# Patient Record
Sex: MALE | Race: WHITE | NOT HISPANIC OR LATINO | Employment: FULL TIME | URBAN - METROPOLITAN AREA
[De-identification: names, ages, dates, MRNs, and addresses within clinical notes are randomized per-mention and may not be internally consistent; named-entity substitution may affect disease eponyms.]

---

## 2017-07-27 ENCOUNTER — OFFICE VISIT - HEALTHEAST (OUTPATIENT)
Dept: INTERNAL MEDICINE | Facility: CLINIC | Age: 27
End: 2017-07-27

## 2017-07-27 ENCOUNTER — COMMUNICATION - HEALTHEAST (OUTPATIENT)
Dept: INTERNAL MEDICINE | Facility: CLINIC | Age: 27
End: 2017-07-27

## 2017-07-27 ENCOUNTER — COMMUNICATION - HEALTHEAST (OUTPATIENT)
Dept: TELEHEALTH | Facility: CLINIC | Age: 27
End: 2017-07-27

## 2017-07-27 DIAGNOSIS — Z00.00 ROUTINE GENERAL MEDICAL EXAMINATION AT A HEALTH CARE FACILITY: ICD-10-CM

## 2017-07-27 LAB
CHOLEST SERPL-MCNC: 130 MG/DL
FASTING STATUS PATIENT QL REPORTED: YES
HDLC SERPL-MCNC: 46 MG/DL
LDLC SERPL CALC-MCNC: 75 MG/DL
TRIGL SERPL-MCNC: 43 MG/DL

## 2017-07-27 ASSESSMENT — MIFFLIN-ST. JEOR: SCORE: 1928.54

## 2018-08-03 ENCOUNTER — AMBULATORY - HEALTHEAST (OUTPATIENT)
Dept: INTERNAL MEDICINE | Facility: CLINIC | Age: 28
End: 2018-08-03

## 2018-08-06 ENCOUNTER — OFFICE VISIT - HEALTHEAST (OUTPATIENT)
Dept: INTERNAL MEDICINE | Facility: CLINIC | Age: 28
End: 2018-08-06

## 2018-08-06 DIAGNOSIS — M67.431 GANGLION CYST OF WRIST, RIGHT: ICD-10-CM

## 2018-08-06 DIAGNOSIS — Z00.00 ROUTINE GENERAL MEDICAL EXAMINATION AT A HEALTH CARE FACILITY: ICD-10-CM

## 2018-08-06 DIAGNOSIS — T70.0XXA EAR BAROTRAUMA, INITIAL ENCOUNTER: ICD-10-CM

## 2018-08-06 LAB
ALBUMIN SERPL-MCNC: 4.6 G/DL (ref 3.5–5)
ALP SERPL-CCNC: 62 U/L (ref 45–120)
ALT SERPL W P-5'-P-CCNC: 28 U/L (ref 0–45)
ANION GAP SERPL CALCULATED.3IONS-SCNC: 7 MMOL/L (ref 5–18)
AST SERPL W P-5'-P-CCNC: 22 U/L (ref 0–40)
BILIRUB SERPL-MCNC: 0.8 MG/DL (ref 0–1)
BUN SERPL-MCNC: 17 MG/DL (ref 8–22)
CALCIUM SERPL-MCNC: 10.2 MG/DL (ref 8.5–10.5)
CHLORIDE BLD-SCNC: 106 MMOL/L (ref 98–107)
CHOLEST SERPL-MCNC: 178 MG/DL
CO2 SERPL-SCNC: 26 MMOL/L (ref 22–31)
CREAT SERPL-MCNC: 0.95 MG/DL (ref 0.7–1.3)
ERYTHROCYTE [DISTWIDTH] IN BLOOD BY AUTOMATED COUNT: 12 % (ref 11–14.5)
FASTING STATUS PATIENT QL REPORTED: NORMAL
GFR SERPL CREATININE-BSD FRML MDRD: >60 ML/MIN/1.73M2
GLUCOSE BLD-MCNC: 91 MG/DL (ref 70–125)
HCT VFR BLD AUTO: 47.5 % (ref 40–54)
HDLC SERPL-MCNC: 53 MG/DL
HGB BLD-MCNC: 15.8 G/DL (ref 14–18)
LDLC SERPL CALC-MCNC: 102 MG/DL
MCH RBC QN AUTO: 28.7 PG (ref 27–34)
MCHC RBC AUTO-ENTMCNC: 33.1 G/DL (ref 32–36)
MCV RBC AUTO: 87 FL (ref 80–100)
PLATELET # BLD AUTO: 199 THOU/UL (ref 140–440)
PMV BLD AUTO: 8.5 FL (ref 7–10)
POTASSIUM BLD-SCNC: 4.3 MMOL/L (ref 3.5–5)
PROT SERPL-MCNC: 7.3 G/DL (ref 6–8)
RBC # BLD AUTO: 5.49 MILL/UL (ref 4.4–6.2)
SODIUM SERPL-SCNC: 139 MMOL/L (ref 136–145)
TRIGL SERPL-MCNC: 115 MG/DL
WBC: 7.4 THOU/UL (ref 4–11)

## 2018-08-06 ASSESSMENT — MIFFLIN-ST. JEOR: SCORE: 1990.92

## 2018-08-07 ENCOUNTER — COMMUNICATION - HEALTHEAST (OUTPATIENT)
Dept: INTERNAL MEDICINE | Facility: CLINIC | Age: 28
End: 2018-08-07

## 2019-08-08 ENCOUNTER — OFFICE VISIT - HEALTHEAST (OUTPATIENT)
Dept: INTERNAL MEDICINE | Facility: CLINIC | Age: 29
End: 2019-08-08

## 2019-08-08 ENCOUNTER — COMMUNICATION - HEALTHEAST (OUTPATIENT)
Dept: INTERNAL MEDICINE | Facility: CLINIC | Age: 29
End: 2019-08-08

## 2019-08-08 DIAGNOSIS — Z23 NEED FOR DIPHTHERIA-TETANUS-PERTUSSIS (TDAP) VACCINE: ICD-10-CM

## 2019-08-08 DIAGNOSIS — Z00.00 ROUTINE GENERAL MEDICAL EXAMINATION AT A HEALTH CARE FACILITY: ICD-10-CM

## 2019-08-08 LAB
ALBUMIN SERPL-MCNC: 4.7 G/DL (ref 3.5–5)
ALP SERPL-CCNC: 72 U/L (ref 45–120)
ALT SERPL W P-5'-P-CCNC: 27 U/L (ref 0–45)
ANION GAP SERPL CALCULATED.3IONS-SCNC: 7 MMOL/L (ref 5–18)
AST SERPL W P-5'-P-CCNC: 25 U/L (ref 0–40)
BILIRUB SERPL-MCNC: 0.9 MG/DL (ref 0–1)
BUN SERPL-MCNC: 17 MG/DL (ref 8–22)
CALCIUM SERPL-MCNC: 10.2 MG/DL (ref 8.5–10.5)
CHLORIDE BLD-SCNC: 107 MMOL/L (ref 98–107)
CHOLEST SERPL-MCNC: 169 MG/DL
CO2 SERPL-SCNC: 26 MMOL/L (ref 22–31)
CREAT SERPL-MCNC: 1.13 MG/DL (ref 0.7–1.3)
ERYTHROCYTE [DISTWIDTH] IN BLOOD BY AUTOMATED COUNT: 12.1 % (ref 11–14.5)
FASTING STATUS PATIENT QL REPORTED: YES
GFR SERPL CREATININE-BSD FRML MDRD: >60 ML/MIN/1.73M2
GLUCOSE BLD-MCNC: 100 MG/DL (ref 70–125)
HCT VFR BLD AUTO: 47.7 % (ref 40–54)
HDLC SERPL-MCNC: 51 MG/DL
HGB BLD-MCNC: 16.1 G/DL (ref 14–18)
LDLC SERPL CALC-MCNC: 104 MG/DL
MCH RBC QN AUTO: 29.1 PG (ref 27–34)
MCHC RBC AUTO-ENTMCNC: 33.8 G/DL (ref 32–36)
MCV RBC AUTO: 86 FL (ref 80–100)
PLATELET # BLD AUTO: 208 THOU/UL (ref 140–440)
PMV BLD AUTO: 8.1 FL (ref 7–10)
POTASSIUM BLD-SCNC: 4.9 MMOL/L (ref 3.5–5)
PROT SERPL-MCNC: 7.2 G/DL (ref 6–8)
RBC # BLD AUTO: 5.54 MILL/UL (ref 4.4–6.2)
SODIUM SERPL-SCNC: 140 MMOL/L (ref 136–145)
TRIGL SERPL-MCNC: 72 MG/DL
WBC: 6 THOU/UL (ref 4–11)

## 2019-08-08 ASSESSMENT — MIFFLIN-ST. JEOR: SCORE: 1919.47

## 2020-09-02 ENCOUNTER — OFFICE VISIT - HEALTHEAST (OUTPATIENT)
Dept: FAMILY MEDICINE | Facility: CLINIC | Age: 30
End: 2020-09-02

## 2020-09-02 ENCOUNTER — COMMUNICATION - HEALTHEAST (OUTPATIENT)
Dept: SCHEDULING | Facility: CLINIC | Age: 30
End: 2020-09-02

## 2020-09-02 DIAGNOSIS — T63.444A BEE STING REACTION, UNDETERMINED INTENT, INITIAL ENCOUNTER: ICD-10-CM

## 2020-09-02 RX ORDER — VIT C/HESPERIDIN/BIOFLAVONOIDS 500-100 MG
TABLET ORAL DAILY
Status: SHIPPED | COMMUNITY
Start: 2020-09-02 | End: 2024-08-07

## 2020-11-05 ENCOUNTER — OFFICE VISIT - HEALTHEAST (OUTPATIENT)
Dept: INTERNAL MEDICINE | Facility: CLINIC | Age: 30
End: 2020-11-05

## 2020-11-05 DIAGNOSIS — Z23 INFLUENZA VACCINATION ADMINISTERED AT CURRENT VISIT: ICD-10-CM

## 2020-11-05 DIAGNOSIS — Z20.822 ENCOUNTER FOR LABORATORY TESTING FOR COVID-19 VIRUS: ICD-10-CM

## 2020-11-05 DIAGNOSIS — Z83.719 FAMILY HISTORY OF COLONIC POLYPS: ICD-10-CM

## 2020-11-05 DIAGNOSIS — Z14.1 CYSTIC FIBROSIS CARRIER: ICD-10-CM

## 2020-11-05 DIAGNOSIS — Z00.00 ROUTINE GENERAL MEDICAL EXAMINATION AT A HEALTH CARE FACILITY: ICD-10-CM

## 2020-11-05 ASSESSMENT — MIFFLIN-ST. JEOR: SCORE: 1910.17

## 2020-11-08 ENCOUNTER — COMMUNICATION - HEALTHEAST (OUTPATIENT)
Dept: SCHEDULING | Facility: CLINIC | Age: 30
End: 2020-11-08

## 2021-04-03 ENCOUNTER — COMMUNICATION - HEALTHEAST (OUTPATIENT)
Dept: SCHEDULING | Facility: CLINIC | Age: 31
End: 2021-04-03

## 2021-04-06 ENCOUNTER — COMMUNICATION - HEALTHEAST (OUTPATIENT)
Dept: CARE COORDINATION | Facility: CLINIC | Age: 31
End: 2021-04-06

## 2021-04-07 ENCOUNTER — COMMUNICATION - HEALTHEAST (OUTPATIENT)
Dept: SCHEDULING | Facility: CLINIC | Age: 31
End: 2021-04-07

## 2021-05-31 VITALS — HEIGHT: 70 IN | BODY MASS INDEX: 30.78 KG/M2 | WEIGHT: 215 LBS

## 2021-05-31 NOTE — PROGRESS NOTES
"Office Visit - Follow up    Sheldon Downing   28 y.o. 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  Medical history entirely negative.  DPT is updated.    General health has been excellent without major concerns    Previous history reviewed    History of hypersensitivity to Ceclor with hives no other known allergies    He is an avid hiker and skier.    Non-smoker ethanol intake minimal.  No other new concerns or complaints.    ROS: A comprehensive review of systems was performed and was otherwise negative except as mentioned above.     Exam  Alert and oriented head and neck negative EENT normal skin and lymphatics negative lungs clear heart normal abdomen benign extremities normal neuro exam negative   /70   Pulse 63   Ht 5' 9.5\" (1.765 m)   Wt 213 lb (96.6 kg)   SpO2 99%   BMI 31.00 kg/m      Assessment and Plan  Normal physical exam labs pending    Sheldon was seen today for annual exam.    Diagnoses and all orders for this visit:    Need for diphtheria-tetanus-pertussis (Tdap) vaccine  -     Tdap vaccine,  8yo or older,  IM  -     HM2(CBC w/o Differential); Future  -     Comprehensive Metabolic Panel; Future  -     Lipid Cascade; Future  -     HM2(CBC w/o Differential)  -     Comprehensive Metabolic Panel  -     Lipid Cascade    Routine general medical examination at a health care facility          Time: total time spent with the patient was 25 minutes of which >50% was spent in counseling and coordination of care        Allergies   Allergen Reactions     Ceclor [Cefaclor] Hives       Medications :  Prior to Admission medications    Medication Sig Start Date End Date Taking? Authorizing Provider   MULTIVITAMIN ORAL Take by mouth daily.          Yes PROVIDER, HISTORICAL        Past Medical History: No past medical history on " file.    Past Surgical History: No past surgical history on file.    Social History:   Social History     Socioeconomic History     Marital status:      Spouse name: Not on file     Number of children: Not on file     Years of education: Not on file     Highest education level: Not on file   Occupational History     Not on file   Social Needs     Financial resource strain: Not on file     Food insecurity:     Worry: Not on file     Inability: Not on file     Transportation needs:     Medical: Not on file     Non-medical: Not on file   Tobacco Use     Smoking status: Never Smoker     Smokeless tobacco: Former User     Types: Chew   Substance and Sexual Activity     Alcohol use: Not on file     Drug use: Not on file     Sexual activity: Not on file   Lifestyle     Physical activity:     Days per week: Not on file     Minutes per session: Not on file     Stress: Not on file   Relationships     Social connections:     Talks on phone: Not on file     Gets together: Not on file     Attends [employer] service: Not on file     Active member of club or organization: Not on file     Attends meetings of clubs or organizations: Not on file     Relationship status: Not on file     Intimate partner violence:     Fear of current or ex partner: Not on file     Emotionally abused: Not on file     Physically abused: Not on file     Forced sexual activity: Not on file   Other Topics Concern     Not on file   Social History Narrative     Not on file       Family History: No family history on file.      Miles Silva MD

## 2021-06-01 VITALS — WEIGHT: 227 LBS | HEIGHT: 70 IN | BODY MASS INDEX: 32.5 KG/M2

## 2021-06-03 VITALS — WEIGHT: 213 LBS | HEIGHT: 70 IN | BODY MASS INDEX: 30.49 KG/M2

## 2021-06-04 VITALS
DIASTOLIC BLOOD PRESSURE: 80 MMHG | TEMPERATURE: 97.8 F | BODY MASS INDEX: 31.29 KG/M2 | WEIGHT: 215 LBS | SYSTOLIC BLOOD PRESSURE: 137 MMHG | OXYGEN SATURATION: 98 % | HEART RATE: 52 BPM | RESPIRATION RATE: 16 BRPM

## 2021-06-05 VITALS
HEIGHT: 70 IN | DIASTOLIC BLOOD PRESSURE: 72 MMHG | BODY MASS INDEX: 29.95 KG/M2 | SYSTOLIC BLOOD PRESSURE: 116 MMHG | HEART RATE: 80 BPM | WEIGHT: 209.2 LBS

## 2021-06-11 NOTE — TELEPHONE ENCOUNTER
RN Triage:    Was stung by bee at back of throat 1 hour ago.  Throat is sore and feels swollen.  Does not feel as though his breathing is compromised.  Precautions discussed.  He plans to go to [facility] in clinic now per guideline.    Brenda Welch RN  [facility] Nurse Advisor    Reason for Disposition    Sting inside the mouth    Additional Information    Negative: Passed out (i.e., fainted, collapsed and was not responding)    Negative: Wheezing or difficulty breathing    Negative: Hoarseness, cough, or tightness in the throat or chest    Negative: Swollen tongue or difficulty swallowing    Negative: Life-threatening reaction in past to sting (anaphylaxis) and < 2 hours since sting    Negative: Sounds like a life-threatening emergency to the triager    Negative: Not a bee, wasp, hornet, or yellow jacket sting    Negative: Widespread hives, itching, or facial swelling and started within 2 hours of sting    Negative: Vomiting or abdominal cramps and started within 2 hours of sting    Negative: Gave epinephrine shot and no symptoms now    Negative: Patient sounds very sick or weak to the triager    Protocols used: BEE STING-A-[place]

## 2021-06-12 NOTE — PROGRESS NOTES
"Office Visit - Physical    Sheldon Downing   26 y.o. male    Date of Visit: 7/27/2017    Chief Complaint   Patient presents with     Establish Care     physical, fasting       Subjective: Healthy 26-year-old gentleman here for physical exam    Father's history is well known as he is an active patient and his history of coronary disease and hyperlipidemia    General health has been excellent no major concerns    Has had some systolic blood pressure readings in the high 130s primarily at urgent care appointments dental appointments at [facility] and I did reassure him regarding the    Head barotrauma to his ears as a result of scuba diving in the past now has intermittent discomfort right ear    No other specific complaints    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  No major injuries.    No other concerns    ROS: A comprehensive review of systems was performed and was otherwise negative except as mentioned above.    Exam   Healthy-appearing young male no acute distress normal cognitive function skin negative EENT normal right ear exam unremarkable heart normal lungs clear abdomen benign genitalia normal extremities normal neuro exam negative  /66  Pulse 66  Ht 5' 9.5\" (1.765 m)  Wt 215 lb (97.5 kg)  SpO2 98%  BMI 31.29 kg/m2    Assessment and Plan    Normal physical exam labs pending.    Reassured regarding blood pressure readings    I gave him the name of Canelo sherwood he wanted consultation regarding his right ear exam which is negative    Sheldon was seen today for establish care.    Diagnoses and all orders for this visit:    Routine general medical examination at a health care facility  -     2(CBC w/o Differential); Future  -     Comprehensive Metabolic Panel; Future  -     Lipid Cascade; Future  -     Vitamin D, Total (25-Hydroxy)  -     " HM2(CBC w/o Differential)  -     Comprehensive Metabolic Panel  -     Lipid Cascade              Medications:   Prior to Admission medications    Medication Sig Start Date End Date Taking? Authorizing Provider   MULTIVITAMIN ORAL Take by mouth.   Yes PROVIDER, HISTORICAL       Allergies:  Allergies   Allergen Reactions     Ceclor [Cefaclor] Hives       Immunizations:   There is no immunization history on file for this patient.    Past Medical History: No past medical history on file.    Past Surgical History: No past surgical history on file.    Family History: No family history on file.    Social History:   Social History     Social History     Marital status:      Spouse name: N/A     Number of children: N/A     Years of education: N/A     Occupational History     Not on file.     Social History Main Topics     Smoking status: Unknown If Ever Smoked     Smokeless tobacco: Current User     Types: Chew     Alcohol use Not on file     Drug use: Not on file     Sexual activity: Not on file     Other Topics Concern     Not on file     Social History Narrative     No narrative on file         Miles Silva MD

## 2021-06-12 NOTE — PATIENT INSTRUCTIONS - HE
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  He has been historically very healthy and feels great.  New baby arrived in 2020.  Issues are as follows:    Respiratory illness in 2020 that could have been Covid, will check his antibody status today.  He is aware that antibody positivity only means that he had previous infection with virus, but he does not project long-term immunity, given current knowledge.  He did have a negative nasal PCR swab test September 10, 2020.  He is totally asymptomatic at this point.  His illness in 2020 lasted about a week.    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  He was told by the genetic laboratory that the gene has been associated with increased risk of pancreatic cancer.  Unclear how to use this information at this point in time.  I told him that there is currently no generally accepted screening strategy for pancreatic cancer.  Very importantly, he should maintain healthy weight and avoid future type 2 diabetes, and also keep alcohol consumption to a minimum, since these are all measures that are known to help reduce risk for pancreatic cancer.    Family history of colon polyps (father).  Father had several large polyps, which could not be removed endoscopically and needed surgery.  Unclear whether there are other family members with polyps.  At the moment, there is not yet an established familial polyposis problem.  I suggested to Nikhil that he could start colon cancer screening earlier, meaning at age 40.  That could be done with a colonoscopy.    Favorable cardiovascular risk profile, although there is family history of heart disease in grandparents.  Nikhil himself has had excellent lipid profiles and 2017, 2018, and 2019.  I told him that there is no need to check lipid profile this year.   Blood pressures great.  He exercises vigorously.  No smoking.  At this point, simply maintain healthy lifestyle.    Overweight by body mass index, but he has large muscle mass.  BMI is 30, which actually is the threshold for obesity, but he does a lot of strength training, and has large muscle mass, making the BMI index less relevant for him.  Nonetheless, it might be beneficial to trim off about 10 pounds of fat.    He is willing to get a seasonal flu shot today, so we will administer that.

## 2021-06-16 PROBLEM — Z83.719 FAMILY HISTORY OF COLONIC POLYPS: Status: ACTIVE | Noted: 2020-11-05

## 2021-06-16 PROBLEM — Z14.1 CYSTIC FIBROSIS CARRIER: Status: ACTIVE | Noted: 2020-11-05

## 2021-06-16 NOTE — TELEPHONE ENCOUNTER
Pt on COVID GetWell Loop with red alert for O2 sats <92%. Pt tested COVID positive ~3/30.    Pt has been monitoring his O2 sats from his Gar[employer] watch. During the night, he had 2 hour period with sats 88-89%, average was 91-93%.    During the day, O2 sats 93-96%    Pt denies dyspnea, chest pain, GI sx, H/A, cough. He doesn't own a thermometer.    Pt had many questions re: COVID and monitoring. Reviewed general progression of COVID infection and when to return to ED.  Instructed pt to increase fluid intake and to do deep breathing exercises.  Pt will continued to be monitored through GetWell Loop.

## 2021-06-16 NOTE — TELEPHONE ENCOUNTER
Patient notified of clinician's message and verbalized understanding. No further questions at this time.   Jennifer Black CMA ............... 4:57 PM, 04/07/21

## 2021-06-16 NOTE — TELEPHONE ENCOUNTER
"Patient calling -says he was diagnosed with COVID19 last Thursday.  Saturday was seen in ED for chest pain and tingling in hands after a run.  He says all the tests came back normal and they advised him he likely overexerted himself going for a run with COVID19.    The last couple days he has had tingling in fingers or hands or feet off and on.  He says he has \"a weird feeling\" behind his left knee.  Say it feels like someone is going to push on the back of his knee and make his knee buckle.  No pain, redness or swelling.  Tingling in feet mostly occurs in left foot.      Says he does have a history of poor circulation in hands and feet in general.  They get cold easily and occasionally the tips of his big toes go numb when he is working out really hard.  Of note, he says both is mother and is sister have Raynaud's syndrome.    He has been monitoring his oxygen sats on his watch and the are usually 93%-94%.    No difficulty breathing.  No fever.  Mild cough has resolved.  Denies any other COVID19 symptoms.    Message to PCP team for second level triage - please advise.     Jocelyn Bateman RN  Triage Nurse Advisor      Reason for Disposition    [1] Numbness or tingling in one or both feet AND [2] is a chronic symptom (recurrent or ongoing AND present > 4 weeks)    Additional Information    Negative: [1] SEVERE weakness (i.e., unable to walk or barely able to walk, requires support) AND [2] new onset or worsening    Negative: [1] Weakness (i.e., paralysis, loss of muscle strength) of the face, arm / hand, or leg / foot on one side of the body AND [2] sudden onset AND [3] present now    Negative: [1] Numbness (i.e., loss of sensation) of the face, arm / hand, or leg / foot on one side of the body AND [2] sudden onset AND [3] present now    Negative: [1] Loss of speech or garbled speech AND [2] sudden onset AND [3] present now    Negative: Difficult to awaken or acting confused (e.g., disoriented, slurred speech)    " Negative: Sounds like a life-threatening emergency to the triager    Negative: Confusion, disorientation, or hallucinations is main symptom    Negative: Neck pain is main symptom (and having weakness, numbness, or tingling in arm / hand because of neck pain)    Negative: Back pain is main symptom (and having weakness, numbness, or tingling in leg because of back pain)    Negative: Hand pain is main symptom (and having mild weakness, numbness, or tingling in hand related to hand pain)    Negative: Dizziness is main symptom    Negative: Vision loss or change is main symptom    Negative: Followed a head injury within last 3 days    Negative: Followed a neck injury within last 3 days    Negative: [1] Tingling in both hands and/or feet AND [2] breathing faster than normal AND [3] feels similar to prior panic attack or hyperventilation episode    Negative: Weakness in both sides of the body or weakness all over    Negative: Headache  (and neurologic deficit)    Negative: [1] Back pain AND [2] numbness (loss of sensation) in groin or rectal area    Negative: [1] Unable to urinate (or only a few drops) > 4 hours AND [2] bladder feels very full (e.g., palpable bladder or strong urge to urinate)    Negative: [1] Loss of bladder or bowel control (urine or bowel incontinence; wetting self, leaking stool) AND [2] new onset    Negative: [1] Weakness (i.e., paralysis, loss of muscle strength) of the face, arm / hand, or leg / foot on one side of the body AND [2] sudden onset AND [3] brief (now gone)    Negative: [1] Numbness (i.e., loss of sensation) of the face, arm / hand, or leg / foot on one side of the body AND [2] sudden onset AND [3] brief (now gone)    Negative: [1] Loss of speech or garbled speech AND [2] sudden onset AND [3] brief (now gone)    Negative: Bell's palsy suspected (i.e., weakness on only one side of the face, developing over hours to days, no other symptoms)    Negative: Patient sounds very sick or weak to  the triager    Negative: Neck pain (and neurologic deficit)    Negative: Back pain (and neurologic deficit)    Negative: [1] Weakness of the face, arm / hand, or leg / foot on one side of the body AND [2] gradual onset (e.g., days to weeks) AND [3] present now    Negative: [1] Numbness (i.e., loss of sensation) of the face, arm / hand, or leg / foot on one side of the body AND [2] gradual onset (e.g., days to weeks) AND [3] present now    Negative: [1] Loss of speech or garbled speech AND [2] gradual onset (e.g., days to weeks) AND [3] present now    Negative: [1] Tingling (e.g., pins and needles) of the face, arm / hand, or leg / foot on one side of the body AND [2] present now    Negative: [1] Loss of speech or garbled speech AND [2] is a chronic symptom (recurrent or ongoing AND present > 4 weeks)    Protocols used: NEUROLOGIC DEFICIT-A-AH

## 2021-06-16 NOTE — TELEPHONE ENCOUNTER
Triage Call:   Patient is calling stating he is covid positive and went for a run today, while on the run chest pressure, arm tingling and shortness of breath developed. Patient states the shortness of breath subsided. Chest pressure and tingling in bilateral arms continues. Per protocol guideline patient was advised to be seen in the ED. Patient stated understanding and will go there now.       COVID 19 Nurse Triage Plan/Patient Instructions    Please be aware that novel coronavirus (COVID-19) may be circulating in the community. If you develop symptoms such as fever, cough, or SOB or if you have concerns about the presence of another infection including coronavirus (COVID-19), please contact your health care provider or visit  [email or web address].    Disposition/Instructions    ED Visit recommended. Follow protocol based instructions.      Bring Your Own Device:  Please also bring your smart device(s) (smart phones, tablets, laptops) and their charging cables for your personal use and to communicate with your care team during your visit.      Thank you for taking steps to prevent the spread of this virus.  o Limit your contact with others.  o Wear a simple mask to cover your cough.  o Wash your hands well and often.    Resources    [facility]: About COVID-19: [email or web address]    CDC: What to Do If You're Sick: www.cdc.gov/coronavirus/2019-ncov/about/steps-when-sick.html    CDC: Ending Home Isolation: www.cdc.gov/coronavirus/2019-ncov/hcp/disposition-in-home-patients.html     CDC: Caring for Someone: www.cdc.gov/coronavirus/2019-ncov/if-you-are-sick/care-for-someone.html     [facility]: Interim Guidance for Hospital Discharge to Home: www.health.[facility].[place]/diseases/coronavirus/hcp/hospdischarge.pdf    [facility] clinical trials (COVID-19 research studies): clinicalaffairs.[email or web address]/umn-clinical-trials     Below are the COVID-19 hotlines at the [place] Department of [facility]  ([facility]). Interpreters are available.   o For health questions: Call 111-625-9734 or 1-929.266.3273 (7 a.m. to 7 p.m.)  o For questions about schools and childcare: Call 307-950-9895 or 1-260.411.5422 (7 a.m. to 7 p.m.)     Lynette Perez RN Nurse Triage 4/3/2021 3:23 PM     Reason for Disposition    SEVERE or constant chest pain or pressure (Exception: mild central chest pain, present only when coughing)    Additional Information    Negative: SEVERE difficulty breathing (e.g., struggling for each breath, speaks in single words)    Negative: Difficult to awaken or acting confused (e.g., disoriented, slurred speech)    Negative: Bluish (or gray) lips or face now    Negative: Shock suspected (e.g., cold/pale/clammy skin, too weak to stand, low BP, rapid pulse)    Negative: Sounds like a life-threatening emergency to the triager    Negative: [1] COVID-19 exposure AND [2] no symptoms    Negative: [1] Lives with someone known to have influenza (flu test positive) AND [2] flu-like symptoms (e.g., cough, runny nose, sore throat, SOB; with or without fever)    Negative: [1] Adult with possible COVID-19 symptoms AND [2] triager concerned about severity of symptoms or other causes    Negative: COVID-19 vaccine reaction suspected (e.g., fever, headache, muscle aches) occurring during days 1-3 after getting vaccine    Negative: COVID-19 vaccine, questions about    Negative: COVID-19 and breastfeeding, questions about    Protocols used: CORONAVIRUS (COVID-19) DIAGNOSED OR [id number] 1.3.21

## 2021-06-16 NOTE — TELEPHONE ENCOUNTER
Stay well-hydrated.  Do not do vigorous exercise.  Be on the look out for symptoms that could represent a blood clot, which would be sudden swelling, pain, or color change in one limb, or sudden shortness of breath.

## 2021-06-18 NOTE — PATIENT INSTRUCTIONS - HE
"Patient Instructions by Viki Maxwell PA-C at 9/2/2020 11:20 AM     Author: Viki Maxwell PA-C Service: -- Author Type: Physician Assistant    Filed: 9/2/2020 11:41 AM Encounter Date: 9/2/2020 Status: Signed    : Viki Maxwell PA-C (Physician Assistant)       Also continue Benadryl 25-50 mg every 6 hours until feeling better.    Patient Education     Insect, Spider, and Scorpion Bites and Stings     The black  (top) and brown recluse (bottom) are two poisonous spiders found in the [place].     Most insect bites are harmless and cause only minor swelling or itching. But if youre allergic to insects such as wasps or bees, a sting can cause a life-threatening allergic reaction. Some ticks can carry and transmit serious diseases. The venom (poison) from scorpions and certain spiders can also be deadly, although this is rare. Knowing when to seek emergency care could save your life.  When to go to the emergency room (ER)    Scorpion sting    Bite from a black, red, or brown  spider or brown recluse spider    Severe pain or swelling at the site of bite    A tick that is embedded in your skin and can not be easily removed at home    Signs of an allergic reaction such as:  ? Hives  ? Swelling of your eyes, lips, or the inside of your throat  ? Trouble breathing  ? Dizziness or confusion  What to expect in the ER    If youre having trouble breathing, youll be given oxygen through a mask. In case of severe breathing difficulty, you may have a tube inserted in your throat and be placed on a ventilator (breathing machine).    If you are having a severe allergic reaction from a sting (called anaphylaxis), you may be given a shot of epinephrine. If it is known that you are allergic to bee or wasp stings, your doctor may give you a prescription for an \"epi-pen\" that you can keep with you at all times in case of a sting.    You may receive antivenin (a substance that reverses " the effects of poison) for some spider bites and scorpion stings. Because antivenin can sometimes cause other problems, your doctor will weigh the risks and benefits of this treatment.    Steroids such as prednisone are often used to treat allergic reactions. In many cases, your doctor will also prescribe an antihistamine to help relieve itching.  Easing symptoms of an insect bite or sting    Try to remove a stinger you can see. Use your fingernail, a knife edge, or credit card to scrape against the skin. Do not squeeze or pull.    Apply ice or a cold compress to reduce pain and swelling (keep a thin cloth between the cold source and the skin).   Date Last Reviewed: 12/1/2016 2000-2019 [employer]. [address] [place] [address], [place] [address]. All rights reserved. This information is not intended as a substitute for professional medical care. Always follow your healthcare professional's instructions.

## 2021-06-19 NOTE — PROGRESS NOTES
"Office Visit - Physical    Sheldon Downing   27 y.o. male    Date of Visit: 8/6/2018    Chief Complaint   Patient presents with     Annual Exam     Pt is fasting       Subjective: Very pleasant healthy 27-year-old gentleman here for physical exam    Family history as noted significant for premature coronary disease in his father.  General health has otherwise been excellent he was here for initial physical one year ago.    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    Blood pressure readings have been normal    Personal social and family history unremarkable    He is an avid hiker and ear    Does have a ganglion cyst right wrist that occasionally is interfering with activity I have suggested he seek care at [facility] \"[facility]\"    He did develop issues with barotrauma right ear while scuba diving about 5 years ago I have made a referral to [facility]    Otherwise feels well without complaints        ROS: A comprehensive review of systems was performed and was otherwise negative except as mentioned above.    Exam   Alert and oriented skin and lymphatics normal head and neck unremarkable EENT negative right ear distortion of TM related to barotrauma.  Heart normal lungs clear abdomen negative extremities normal neuro exam negative      /80  Pulse (!) 52  Ht 5' 10\" (1.778 m)  Wt (!) 227 lb (103 kg)  BMI 32.57 kg/m2    Assessment and Plan    Stable physical exam labs pending    Sheldon was seen today for annual exam.    Diagnoses and all orders for this visit:    Ear barotrauma, initial encounter  -     Ambulatory referral to Otolaryngology    Routine general medical examination at a health care facility  -     HM2(CBC w/o Differential); Future  -     Comprehensive Metabolic Panel; Future  -     Lipid Cascade; Future  -     HM2(CBC w/o Differential)  -     Comprehensive Metabolic Panel  -     Lipid Cascade    Ganglion cyst of wrist, right              Medications:   Prior to Admission " medications    Medication Sig Start Date End Date Taking? Authorizing Provider   MULTIVITAMIN ORAL Take by mouth.   Yes PROVIDER, HISTORICAL       Allergies:  Allergies   Allergen Reactions     Ceclor [Cefaclor] Hives       Immunizations:   There is no immunization history on file for this patient.    Past Medical History: No past medical history on file.    Past Surgical History: No past surgical history on file.    Family History: No family history on file.    Social History:   Social History     Social History     Marital status:      Spouse name: N/A     Number of children: N/A     Years of education: N/A     Occupational History     Not on file.     Social History Main Topics     Smoking status: Never Smoker     Smokeless tobacco: Current User     Types: Chew     Alcohol use Not on file     Drug use: Not on file     Sexual activity: Not on file     Other Topics Concern     Not on file     Social History Narrative         Miles Silva MD

## 2021-06-19 NOTE — LETTER
Letter by Miles Silva MD at      Author: Miles Silva MD Service: -- Author Type: --    Filed:  Encounter Date: 8/8/2019 Status: (Other)         Sheldon Downing  [address] [place] [address]             August 8, 2019         Dear Mr. Downing,    Below are the results from your recent visit:    Resulted Orders   HM2(CBC w/o Differential)   Result Value Ref Range    WBC 6.0 4.0 - 11.0 thou/uL    RBC 5.54 4.40 - 6.20 mill/uL    Hemoglobin 16.1 14.0 - 18.0 g/dL    Hematocrit 47.7 40.0 - 54.0 %    MCV 86 80 - 100 fL    MCH 29.1 27.0 - 34.0 pg    MCHC 33.8 32.0 - 36.0 g/dL    RDW 12.1 11.0 - 14.5 %    Platelets 208 140 - 440 thou/uL    MPV 8.1 7.0 - 10.0 fL   Comprehensive Metabolic Panel   Result Value Ref Range    Sodium 140 136 - 145 mmol/L    Potassium 4.9 3.5 - 5.0 mmol/L    Chloride 107 98 - 107 mmol/L    CO2 26 22 - 31 mmol/L    Anion Gap, Calculation 7 5 - 18 mmol/L    Glucose 100 70 - 125 mg/dL    BUN 17 8 - 22 mg/dL    Creatinine 1.13 0.70 - 1.30 mg/dL    GFR MDRD Af Amer >60 >60 mL/min/1.73m2    GFR MDRD Non Af Amer >60 >60 mL/min/1.73m2    Bilirubin, Total 0.9 0.0 - 1.0 mg/dL    Calcium 10.2 8.5 - 10.5 mg/dL    Protein, Total 7.2 6.0 - 8.0 g/dL    Albumin 4.7 3.5 - 5.0 g/dL    Alkaline Phosphatase 72 45 - 120 U/L    AST 25 0 - 40 U/L    ALT 27 0 - 45 U/L    Narrative    Fasting Glucose reference range is 70-99 mg/dL per  American Diabetes Association (ADA) guidelines.   Lipid Cascade   Result Value Ref Range    Cholesterol 169 <=199 mg/dL    Triglycerides 72 <=149 mg/dL    HDL Cholesterol 51 >=40 mg/dL    LDL Calculated 104 <=129 mg/dL    Patient Fasting > 8hrs? Yes        All labs look great    Please call with questions or contact us using [employer].    Sincerely,        Electronically signed by Miles Silva MD

## 2021-06-26 ENCOUNTER — HEALTH MAINTENANCE LETTER (OUTPATIENT)
Age: 31
End: 2021-06-26

## 2021-06-29 NOTE — PROGRESS NOTES
Progress Notes by Liban Acosta MD at 2020  9:20 AM     Author: Liban Acosta MD Service: -- Author Type: Physician    Filed: 2020 12:28 PM Encounter Date: 2020 Status: Signed    : Liban Acosta MD (Physician)       Office Visit - Follow Up   Sheldon Downing   30 y.o. male    Date of Visit: 2020    Chief Complaint   Patient presents with   ? Annual Exam     covid antibody testing- was sick end of feb. fasting labs        -------------------------------------------------------------------------------------------------------------------------  Assessment and Plan    Annual preventive exam and establishing primary care for this 30-year-old man, who works as a [employer] and .  He has been historically very healthy and feels great.  New baby arrived in 2020.  Issues are as follows:    Respiratory illness in 2020 that could have been Covid, will check his antibody status today.  He is aware that antibody positivity only means that he had previous infection with virus, but he does not project long-term immunity, given current knowledge.  He did have a negative nasal PCR swab test September 10, 2020.  He is totally asymptomatic at this point.  His illness in 2020 lasted about a week.    Asymptomatic carrier for cystic fibrosis mutation F1194H, which was detected when his  baby was found to have 1 copy of the gene.  He was told by the genetic laboratory that the gene has been associated with increased risk of pancreatic cancer.  Unclear how to use this information at this point in time.  I told him that there is currently no generally accepted screening strategy for pancreatic cancer.  Very importantly, he should maintain healthy weight and avoid future type 2 diabetes, and also keep alcohol consumption to a minimum, since these are all measures that are known to help reduce risk for pancreatic cancer.    Family history of colon polyps (father).   Father had several large polyps, which could not be removed endoscopically and needed surgery.  Unclear whether there are other family members with polyps.  At the moment, there is not yet an established familial polyposis problem.  I suggested to Nikhil that he could start colon cancer screening earlier, meaning at age 40.  That could be done with a colonoscopy.    Favorable cardiovascular risk profile, although there is family history of heart disease in grandparents.  Nikhil himself has had excellent lipid profiles and 2017, 2018, and 2019.  I told him that there is no need to check lipid profile this year.  Blood pressures great.  He exercises vigorously.  No smoking.  At this point, simply maintain healthy lifestyle.    Overweight by body mass index, but he has large muscle mass.  BMI is 30, which actually is the threshold for obesity, but he does a lot of strength training, and has large muscle mass, making the BMI index less relevant for him.  Nonetheless, it might be beneficial to trim off about 10 pounds of fat.    He is willing to get a seasonal flu shot today, so we will administer that.    --------------------------------------------------------------------------------------------------------------------------  History of Present Illness  This 30 y.o. old Annual preventive exam and establishing primary care for this 30-year-old man, who works as a [employer] and .  He has been historically very healthy and feels great.  New baby arrived in July 2020.  Issues are as follows:    Wants Covid Ab check  [place] [facility] [place]  Several people got sick  He himself had shortness of breath, fatigue  Was sick for 3 days   9/10/20 1426      COVID-19 Virus by PCR (External Result) Negative Negative          Symptomatic Carrier for cystic fibrosis  [id number]  Detected with his new baby tested positive for 1 copy, born July 2020    Family history colon polyps (father)  Father had large polyps,  needed surgery to remove    Maternal GP (both) has heart disease  Pat GF had heart attack  Lab Results   Component Value Date    CHOL 169 08/08/2019    CHOL 178 08/06/2018    CHOL 130 07/27/2017     Lab Results   Component Value Date    HDL 51 08/08/2019    HDL 53 08/06/2018    HDL 46 07/27/2017     Lab Results   Component Value Date    LDLCALC 104 08/08/2019    LDLCALC 102 08/06/2018    LDLCALC 75 07/27/2017     Lab Results   Component Value Date    TRIG 72 08/08/2019    TRIG 115 08/06/2018    TRIG 43 07/27/2017     No components found for: CHOLHDL    BMI 30  Works out 5 days per week  5 foot 10 inch  Wt Readings from Last 3 Encounters:   11/05/20 209 lb 3.2 oz (94.9 kg)   09/02/20 215 lb (97.5 kg)   08/08/19 213 lb (96.6 kg)     NO smoking  Alcohol per week 5 drinks (glass of win)    30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    Wt Readings from Last 3 Encounters:   11/05/20 209 lb 3.2 oz (94.9 kg)   09/02/20 215 lb (97.5 kg)   08/08/19 213 lb (96.6 kg)     BP Readings from Last 3 Encounters:   11/05/20 116/72   09/02/20 137/80   08/08/19 108/70       Lab Results   Component Value Date    WBC 6.0 08/08/2019    HGB 16.1 08/08/2019    HCT 47.7 08/08/2019     08/08/2019    CHOL 169 08/08/2019    TRIG 72 08/08/2019    HDL 51 08/08/2019    ALT 27 08/08/2019    AST 25 08/08/2019     08/08/2019    K 4.9 08/08/2019     08/08/2019    CREATININE 1.13 08/08/2019    BUN 17 08/08/2019    CO2 26 08/08/2019        Review of Systems: A comprehensive review of systems was negative except as noted.  ---------------------------------------------------------------------------------------------------------------------------    Medications, Allergies, Social, and Problem List   Current Outpatient Medications   Medication Sig Dispense Refill   ? cholecalciferol, vitamin D3, 1,000 unit (25 mcg) tablet Take 2,000 Units by mouth daily.     ? MULTIVITAMIN ORAL Take by mouth daily.           "  ? zinc gluconate 30 mg Tab Take by mouth.       No current facility-administered medications for this visit.      Allergies   Allergen Reactions   ? Ceclor [Cefaclor] Hives     Social History     Tobacco Use   ? Smoking status: Never Smoker   ? Smokeless tobacco: Former User     Types: Chew   Substance Use Topics   ? Alcohol use: Not on file   ? Drug use: Not on file     There is no problem list on file for this patient.       Reviewed, reconciled and updated       Physical Exam   General Appearance:   Mildly overweight, large muscle mass    /72   Pulse 80   Ht 5' 10\" (1.778 m)   Wt 209 lb 3.2 oz (94.9 kg)   BMI 30.02 kg/m      General: Alert, in no distress  Skin: No significant lesion seen.  Eyes/nose/throat: Eyes without scleral icterus, eye movements normal, pupils equal and reactive, oropharynx clear, ears with normal TM's  MSK: Neck with good ROM  Lymphatic: Neck without adenopathy or masses  Endocrine: Thyroid with no nodules to palpation  Pulm: Lungs clear to auscultation bilaterally  Cardiac: Heart with regular rate and rhythm, no murmur or gallop  GI: Abdomen soft, nontender. No palpable enlargement of liver or spleen  MSK: Extremities no tenderness or edema  Neuro: Moves all extremities, without focal weakness  Psych: Alert, normal mental status. Normal affect and speech       Additional Information        Liban Acosta MD               "

## 2021-06-29 NOTE — PROGRESS NOTES
Progress Notes by Viki Maxwell PA-C at 9/2/2020 11:20 AM     Author: Viki Maxwell PA-C Service: -- Author Type: Physician Assistant    Filed: 9/2/2020 12:26 PM Encounter Date: 9/2/2020 Status: Signed    : Viki Maxwell PA-C (Physician Assistant)         Chief Complaint   Patient presents with   ? Allergic Reaction     bee sting in throat   ? Dysphagia       HPI:  Sheldon Downing is a 29 y.o. male who complains of moderate bee sting to R peritonsillar area onset 1 hr ago. He notes the area feels swollen and it feels difficult to swallow. He has been able to drink water. He denies difficulty breathing. Symptoms are constant in duration. He took 2 Benadryl prior to arrival. Denies lightheadedness, syncope, CP, SOB, abd pain, N/V/D, rash, or any other symptoms. He has no hx of anaphylaxis; he has been stung by bees before and typically gets only localized swelling at the site of the sting.    Problem List:  There are no relevant problems documented for this patient.      No past medical history on file.  No past surgical history on file.  Social History     Tobacco Use   ? Smoking status: Never Smoker   ? Smokeless tobacco: Former User     Types: Chew   Substance Use Topics   ? Alcohol use: Not on file       Review of Systems   Constitutional: Negative for chills and fever.   HENT:        Bee sting to peritonsillar area w/ swelling   Respiratory: Negative for shortness of breath.    Cardiovascular: Negative for chest pain.   Gastrointestinal: Negative for abdominal pain, diarrhea, nausea and vomiting.        Dysphagia   Skin: Negative for rash.   All other systems reviewed and are negative.      Vitals:    09/02/20 1126   BP: 137/80   Patient Site: Left Arm   Patient Position: Sitting   Cuff Size: Adult Large   Pulse: (!) 52   Resp: 16   Temp: 97.8  F (36.6  C)   TempSrc: Oral   SpO2: 98%   Weight: 215 lb (97.5 kg)       Physical Exam   Constitutional: He appears well-developed  and well-nourished.  Non-toxic appearance.   HENT:   Head: Normocephalic and atraumatic.   Mouth/Throat: Uvula is midline and mucous membranes are normal. Posterior oropharyngeal edema and posterior oropharyngeal erythema present. No oropharyngeal exudate or tonsillar abscesses.       Neck: Phonation normal.   Handling secretions well   Cardiovascular: Normal rate, regular rhythm and normal heart sounds.   Pulmonary/Chest: Effort normal and breath sounds normal. No stridor.   Neurological: He is alert.   Skin: Skin is warm and dry.   Psychiatric: He has a normal mood and affect.       Labs:  No results found for this or any previous visit (from the past 24 hour(s)).    Radiology:  No results found.    Clinical Decision Making:    No s/sx anaphylaxis; mild swelling & erythema at site of bee sting but airway patent & pt in NAD. Rx prednisone, continue Benadryl. Discussed reasons to seek care immediately such as shortness of breath, syncope, or worsening of throat swelling.    At the end of the encounter, I discussed results, diagnosis, medications. Discussed red flags for immediate return to clinic/ER, as well as indications for follow up if no improvement. Patient understood and agreed to plan. Patient was stable for discharge.    1. Bee sting reaction, undetermined intent, initial encounter  predniSONE (DELTASONE) 20 MG tablet         Return in about 1 day (around 9/3/2020) for Follow up w/ primary care provider if not improved.    TANK Iverson, PA-C  [facility]

## 2021-07-18 ENCOUNTER — OFFICE VISIT (OUTPATIENT)
Dept: FAMILY MEDICINE | Facility: CLINIC | Age: 31
End: 2021-07-18
Payer: COMMERCIAL

## 2021-07-18 VITALS
BODY MASS INDEX: 27.41 KG/M2 | SYSTOLIC BLOOD PRESSURE: 118 MMHG | TEMPERATURE: 97.9 F | HEART RATE: 62 BPM | WEIGHT: 191 LBS | DIASTOLIC BLOOD PRESSURE: 74 MMHG | OXYGEN SATURATION: 99 % | RESPIRATION RATE: 14 BRPM

## 2021-07-18 DIAGNOSIS — S61.411A LACERATION OF RIGHT HAND WITHOUT FOREIGN BODY, INITIAL ENCOUNTER: Primary | ICD-10-CM

## 2021-07-18 PROCEDURE — 99213 OFFICE O/P EST LOW 20 MIN: CPT | Mod: 25 | Performed by: NURSE PRACTITIONER

## 2021-07-18 PROCEDURE — 12001 RPR S/N/AX/GEN/TRNK 2.5CM/<: CPT | Performed by: NURSE PRACTITIONER

## 2021-07-18 RX ORDER — LIDOCAINE HYDROCHLORIDE 20 MG/ML
2 INJECTION, SOLUTION INFILTRATION; PERINEURAL ONCE
Status: COMPLETED | OUTPATIENT
Start: 2021-07-18 | End: 2021-07-18

## 2021-07-18 RX ORDER — LIDOCAINE HYDROCHLORIDE 20 MG/ML
2 INJECTION, SOLUTION EPIDURAL; INFILTRATION; INTRACAUDAL; PERINEURAL ONCE
Status: DISCONTINUED | OUTPATIENT
Start: 2021-07-18 | End: 2021-07-18

## 2021-07-18 RX ADMIN — LIDOCAINE HYDROCHLORIDE 2 ML: 20 INJECTION, SOLUTION INFILTRATION; PERINEURAL at 12:51

## 2021-07-18 NOTE — PROCEDURES
Web area  right medial middle finger cleaned with alcohol swabs and infiltrated with 1.5 mL of 2% lidocaine.  Additional small amount of lidocaine in area that was not anesthetized fully on lower aspect of flap.        Good anesthesia was obtained.    Area was cleaned with copious amounts of normal saline.  No foreign bodies.    Flap area was approximately 1 cm across and 2-1/2 cm long.  Entire lateral aspect of flap laceration was adherent another 50% was not.  Area did not appear devitalized.    Using usual sterile technique, area was repaired with 4/0 Ethilon -4 sutures.  3 sutures did break through the flap were removed.    Was unable to suture lower portion due to thinness of flap.  Did use 2 Steri-Strips on this portion.    Tolerated procedure well.  Bleeding was controlled.    With nonadherent dressing, tracing, large amount of Kerlix and paper tape.

## 2021-07-18 NOTE — PATIENT INSTRUCTIONS
Augmentin twice daily for 5 days to prevent infection in your finger.    Leave current dressing on for 24 hours.    After that, remove dressing, clean gently with soap and water, blot dry, apply Neosporin, nonstick dressing or large Band-Aid.    Steri-Strips will fall off on their own.  The ends will start to curl up.  You can cut the ends as they curl up.    Sutures out in 10 to 14 days.  You can do that here with no additional charge or at your clinic.     Come back with any signs of infection such as swelling, purulent/pus drainage, severe pain.        Patient Education     Hand Laceration: All Closures  A laceration is a cut through the skin. Deep cuts usually require stitches. Minor cuts may be closed with surgical tape or skin adhesive.    X-rays may be done if something may have entered the skin through the cut, such as broken glass. You may also be given a tetanus shot if you are not up to date on this vaccination and the object that cut you may carry tetanus.     Home care    Follow all instructions for taking medicines that your healthcare provider may prescribe.  ? Your healthcare provider may prescribe an antibiotic. This is to help prevent infection. Take the medicine every day until it's gone or you are told to stop. You should not have any left over.  ? Your healthcare provider may prescribe medicine for pain. Know how and when to take this medicine.    The healthcare provider may prescribe medicines for pain. Follow instructions for taking them.    Follow the healthcare provider s instructions on how to care for the cut.    Keep the wound clean and dry. Don't get the wound wet until you are told it's OK to do so. If the bandage gets wet, remove it. Gently pat the wound dry with a clean cloth. Then put on a clean, dry bandage.    To help prevent infection, wash your hands with soap and water before and after caring for the wound.     Caring for stitches: Once you no longer need to keep the stitches  dry, clean the wound daily. First, remove the bandage. Then wash the area gently with soap and warm water, or as directed by the healthcare provider. Use a wet cotton swab to loosen and remove any blood or crust that forms. After cleaning, apply a thin layer of antibiotic ointment if advised. Then put on a new bandage unless you are told not to.    Caring for skin glue: Don t put any liquid, ointment, or cream on the wound while the glue is in place. It's OK to shower but don't submerge under water for at least 7 days. Avoid activities that cause heavy sweating. Protect the wound from sunlight. Don't scratch, rub, or pick at the adhesive film. Don't place tape directly over the film. The glue should peel off by itself within 5 to 10 days. Call your provider if you have skin blistering or excessive itching.    Caring for surgical tape: Keep the area dry. If it gets wet, blot it dry with a clean towel. Surgical tape usually falls off within 7 to 10 days. If it has not fallen off after 10 days, you can take it off yourself. Put mineral oil or petroleum jelly on a cotton ball and gently rub the tape until it's removed.    Shower as usual once you can get the wound wet, but don't soak the wound in water. This means no tub baths or swimming.    Check the wound daily for signs of infection listed below. Even with proper treatment, a wound infection may sometimes occur.    Follow-up care  Follow up with your healthcare provider, or as advised. If you have stitches, be sure to return as directed to have them removed.   When to seek medical advice  Call your healthcare provider right away if any of these occur:    Wound bleeding not controlled by direct pressure    Signs of infection, including increasing pain in the wound, increasing wound redness or swelling, or pus or bad odor coming from the wound    Fever of 100.4 F (38. C) or higher, or as directed by your healthcare provider    Chills    Stitches come apart or fall out  or surgical tape falls off before 7 days    Wound edges reopen    Wound changes colors    Numbness or weakness in the affected hand     Decreased movement of the hand  [employer] last reviewed this educational content on 6/1/2020 2000-2021 [employer]. All rights reserved. This information is not intended as a substitute for professional medical care. Always follow your healthcare professional's instructions.

## 2021-07-18 NOTE — PROGRESS NOTES
Assessment & Plan     Laceration of right hand without foreign body, initial encounter    At approximately 18-hour viv since injury.  Allergic to cephalosporins.    - amoxicillin-clavulanate (AUGMENTIN) 875-125 MG tablet  Dispense: 10 tablet; Refill: 0  - lidocaine (PF) (XYLOCAINE) 2 % injection 2 mL    Patient with skin flap on middle finger dorsal aspect.  Discussed closing it at around 18 hours would cause additional risk for infection, but the area still oozing and it is a large area on his dominant hand, so would recommend closure and prophylactic antibiotics.  Patient says he will proceed. Loose approximation.    Able to place 4 sutures with some difficulty due to the thinness of the flap - see note.  Bleeding was controlled.    Discussed signs of infection.    #4 4-0 Ethilon right middle finger  2 Steri-Strips.    Small, pencil eraser tip size skin flap on pointer finger medial aspect was cut iris scissors, bacitracin and Band-Aid applied.     Tylenol and ibuprofen as needed.  Elevate.    20 minutes spent thinking and repairing affected finger.  Additional 10 minutes spent with patient interview and instructions for discharge.          Return in about 12 days (around 7/30/2021) for Suture removal.    Tammi Tipton, [facility]    Kerry Chung is a 30 year old male who presents to clinic today for the following health issues:  Chief Complaint   Patient presents with     Laceration     right pointer and middle finger over 12 hours ago     HPI    Cut right middle and index finger on spring of a couch while lifting.  Flap laceration to middle finger - 18 hours old and small avulsion/flap to left index finger around nailbed.       Tdap last in 2019.         Review of Systems        Objective    /74   Pulse 62   Temp 97.9  F (36.6  C) (Oral)   Resp 14   Wt 86.6 kg (191 lb)   SpO2 99%   BMI 27.41 kg/m    Physical Exam  Constitutional:       Appearance: He  is well-developed.   Eyes:      General:         Right eye: No discharge.         Left eye: No discharge.      Conjunctiva/sclera: Conjunctivae normal.   Cardiovascular:      Pulses: Normal pulses.   Pulmonary:      Effort: Pulmonary effort is normal.   Musculoskeletal:         General: No swelling. Normal range of motion.      Comments: No visible tendons.  Able to move finger normally.   Skin:     General: Skin is warm.      Comments: Flap laceration right middle finger 2-1/2 cm long by 1 cm wide.  Entire lateral edge is adherent and entire medial edge is nonadherent with a nonadherent portion along the lower edge.    Skin tear medial lower edge of nail on ring finger of right hand.  Laceration into the nailbed approximately 3 mm.  No hematomas seen.   Neurological:      Mental Status: He is alert and oriented to person, place, and time.   Psychiatric:         Mood and Affect: Mood normal.         Behavior: Behavior normal.         Thought Content: Thought content normal.         Judgment: Judgment normal.

## 2021-10-06 ENCOUNTER — OFFICE VISIT (OUTPATIENT)
Dept: FAMILY MEDICINE | Facility: CLINIC | Age: 31
End: 2021-10-06
Payer: COMMERCIAL

## 2021-10-06 ENCOUNTER — TELEPHONE (OUTPATIENT)
Dept: NURSING | Facility: CLINIC | Age: 31
End: 2021-10-06

## 2021-10-06 VITALS
DIASTOLIC BLOOD PRESSURE: 86 MMHG | WEIGHT: 187.13 LBS | HEART RATE: 61 BPM | SYSTOLIC BLOOD PRESSURE: 116 MMHG | BODY MASS INDEX: 26.85 KG/M2 | OXYGEN SATURATION: 99 %

## 2021-10-06 DIAGNOSIS — R20.0 NUMBNESS AND TINGLING IN BOTH HANDS: Primary | ICD-10-CM

## 2021-10-06 DIAGNOSIS — R20.2 NUMBNESS AND TINGLING IN BOTH HANDS: Primary | ICD-10-CM

## 2021-10-06 LAB
ALBUMIN SERPL-MCNC: 4.6 G/DL (ref 3.5–5)
ALP SERPL-CCNC: 57 U/L (ref 45–120)
ALT SERPL W P-5'-P-CCNC: 30 U/L (ref 0–45)
ANION GAP SERPL CALCULATED.3IONS-SCNC: 11 MMOL/L (ref 5–18)
AST SERPL W P-5'-P-CCNC: 27 U/L (ref 0–40)
BILIRUB SERPL-MCNC: 0.7 MG/DL (ref 0–1)
BUN SERPL-MCNC: 17 MG/DL (ref 8–22)
CALCIUM SERPL-MCNC: 10.5 MG/DL (ref 8.5–10.5)
CHLORIDE BLD-SCNC: 107 MMOL/L (ref 98–107)
CO2 SERPL-SCNC: 24 MMOL/L (ref 22–31)
CREAT SERPL-MCNC: 0.87 MG/DL (ref 0.7–1.3)
FOLATE SERPL-MCNC: 16.7 NG/ML
GFR SERPL CREATININE-BSD FRML MDRD: >90 ML/MIN/1.73M2
GLUCOSE BLD-MCNC: 87 MG/DL (ref 70–125)
POTASSIUM BLD-SCNC: 4.9 MMOL/L (ref 3.5–5)
PROT SERPL-MCNC: 7.2 G/DL (ref 6–8)
SODIUM SERPL-SCNC: 142 MMOL/L (ref 136–145)
TSH SERPL DL<=0.005 MIU/L-ACNC: 1.6 UIU/ML (ref 0.3–5)
VIT B12 SERPL-MCNC: 1238 PG/ML (ref 213–816)

## 2021-10-06 PROCEDURE — 82607 VITAMIN B-12: CPT | Performed by: NURSE PRACTITIONER

## 2021-10-06 PROCEDURE — 99213 OFFICE O/P EST LOW 20 MIN: CPT | Performed by: NURSE PRACTITIONER

## 2021-10-06 PROCEDURE — 80053 COMPREHEN METABOLIC PANEL: CPT | Performed by: NURSE PRACTITIONER

## 2021-10-06 PROCEDURE — 84630 ASSAY OF ZINC: CPT | Mod: 90 | Performed by: NURSE PRACTITIONER

## 2021-10-06 PROCEDURE — 86618 LYME DISEASE ANTIBODY: CPT | Performed by: NURSE PRACTITIONER

## 2021-10-06 PROCEDURE — 99000 SPECIMEN HANDLING OFFICE-LAB: CPT | Performed by: NURSE PRACTITIONER

## 2021-10-06 PROCEDURE — 83921 ORGANIC ACID SINGLE QUANT: CPT | Performed by: NURSE PRACTITIONER

## 2021-10-06 PROCEDURE — 82746 ASSAY OF FOLIC ACID SERUM: CPT | Performed by: NURSE PRACTITIONER

## 2021-10-06 PROCEDURE — 36415 COLL VENOUS BLD VENIPUNCTURE: CPT | Performed by: NURSE PRACTITIONER

## 2021-10-06 PROCEDURE — 84443 ASSAY THYROID STIM HORMONE: CPT | Performed by: NURSE PRACTITIONER

## 2021-10-06 RX ORDER — MULTIVIT WITH MINERALS/LUTEIN
1000 TABLET ORAL DAILY
COMMUNITY

## 2021-10-06 NOTE — PATIENT INSTRUCTIONS
We are going to check a variety of labs today to better understand your numbness and tingling.  Results will filter through on [employer].    I would like to get an EMG of your bilateral arms.  Please call [phone] to schedule that.    Please schedule an appointment for follow-up with Dr. Acosta

## 2021-10-06 NOTE — PROGRESS NOTES
"    Assessment & Plan     Numbness and tingling in both hands  Suspect some sort of entrapment although Phalen's and Spurling's were negative today.  Proceed with EMG and labs.  He seems to be worried about some sort of catastrophic neurological disease such as MS, etc.    - EMG; Future  - Comprehensive metabolic panel; Future  - Folate; Future  - Lyme Disease Huma with reflex to WB Serum; Future  - Methylmalonic Acid; Future  - TSH; Future  - Vitamin B12; Future  - Zinc; Future    Patient Instructions   We are going to check a variety of labs today to better understand your numbness and tingling.  Results will filter through on [facility].    I would like to get an EMG of your bilateral arms.  Please call [phone] to schedule that.    Please schedule an appointment for follow-up with Dr. Acosta       BMI:   Estimated body mass index is 26.85 kg/m  as calculated from the following:    Height as of 4/3/21: 1.778 m (5' 10\").    Weight as of this encounter: 84.9 kg (187 lb 2 oz).         Return in about 1 month (around 11/6/2021) for Follow up.    Dean Quintana, [facility]    Kerry Chung is a 31 year old who presents for the following health issues  HPI     Intermittent numbness and tingling in his fingers since March.  Symptoms seem to correlate to when he was diagnosed with Covid.    Had an uncomplicated course of Covid.    Describes intermittent numbness and tingling in his digits of his bilateral hands.  Does work at a desk, doing computer work.  Exercises frequently.    Has not noticed any discoloration.  Family history of Raynaud's.    No neck pain.  No elbow pain.  No wrist pain      Review of Systems   Constitutional, HEENT, cardiovascular, pulmonary, gi and gu systems are negative, except as otherwise noted.      Objective    /86 (BP Location: Right arm, Patient Position: Sitting, Cuff Size: Adult Regular)   Pulse 61   Wt 84.9 kg (187 lb 2 oz)   SpO2 99%   " BMI 26.85 kg/m    Body mass index is 26.85 kg/m .  Physical Exam     Negative Phalen and Spurling's.  Normal capillary refill bilaterally

## 2021-10-07 LAB — B BURGDOR IGG+IGM SER QL: 0.06

## 2021-10-07 NOTE — TELEPHONE ENCOUNTER
Patient was in today about numbness and tingling in his hand. There were 4 tests ordered, one was Vitamin B12.   Patient reports that he takes a One a Day men's multivitamin with 260% of daily vitamin B12 the night before his morning lab work. Patient looked on line for high vitamin B12 and is very concerned and would like a call back from his provider regarding next steps.     Routing message to ordering provider to return call to patient at [phone], ok to leave a detailed message on voice mail.  Debora Aly RN   10/06/21 8:33 PM  [facility] Nurse Advisor

## 2021-10-07 NOTE — TELEPHONE ENCOUNTER
Called patient and offered reassurance with regard to his B12 levels.  He does take vitamin B12 supplement and I told him that it might be a good idea to stop this.  His B12 levels are not dangerous

## 2021-10-09 LAB — ZINC SERPL-MCNC: 72.7 UG/DL

## 2021-10-13 ENCOUNTER — OFFICE VISIT (OUTPATIENT)
Dept: PHYSICAL MEDICINE AND REHAB | Facility: CLINIC | Age: 31
End: 2021-10-13
Attending: NURSE PRACTITIONER
Payer: COMMERCIAL

## 2021-10-13 ENCOUNTER — TELEPHONE (OUTPATIENT)
Dept: FAMILY MEDICINE | Facility: CLINIC | Age: 31
End: 2021-10-13

## 2021-10-13 DIAGNOSIS — R20.0 NUMBNESS AND TINGLING IN BOTH HANDS: ICD-10-CM

## 2021-10-13 DIAGNOSIS — R20.2 NUMBNESS AND TINGLING IN BOTH HANDS: ICD-10-CM

## 2021-10-13 PROCEDURE — 95886 MUSC TEST DONE W/N TEST COMP: CPT | Mod: RT | Performed by: PHYSICAL MEDICINE & REHABILITATION

## 2021-10-13 PROCEDURE — 95911 NRV CNDJ TEST 9-10 STUDIES: CPT | Performed by: PHYSICAL MEDICINE & REHABILITATION

## 2021-10-13 NOTE — LETTER
10/13/2021         RE: Sheldon Downing  [address] [place] [address]        Dear Colleague,    Thank you for referring your patient, Sheldon Downing, to the [facility]. Please see a copy of my visit note below.    Patient presents at the request of Dean Quintana CNP for bilateral upper extremity EMG.  He has 6 to 7-month history of intermittent numbness and tingling diffusely throughout all the fingers of both hands right worse than left.  He is right-handed.  This is since Covid infection last March.  On exam he has normal sensation to light touch both the upper extremities, reflexes 1+ symmetric throughout the upper extremities and normal strength throughout the major muscle groups of the upper extremities.    EMG/NCS  results: Please see scanned full report    Comment NCS: Normal study  1.  Normal nerve conduction studies bilateral upper extremities.  This includes normal right median and ulnar SNAPs, bilateral median and ulnar transcarpal studies, and bilateral median and ulnar CMAPs.  Normal left ulnar F-wave.    Comment EMG: Normal study  1.  Normal needle EMG bilateral upper extremities.    Interpretation: Normal study    1. There is no electrodiagnostic evidence of cervical radiculopathy, brachial plexopathy, or focal neuropathy in the bilateral upper extremities.  Specifically, there is no electrodiagnostic evidence of median or ulnar neuropathy in the bilateral upper extremities.     The testing was completed in its entirety by the physician.       It was our pleasure caring for your patient today, if there any questions or concerns please do not hesitate to contact us.        Again, thank you for allowing me to participate in the care of your patient.        Sincerely,        Pipo Casper DO

## 2021-10-13 NOTE — PROGRESS NOTES
Please call the patient and let him know that I reviewed the results of his EMG study.  His EMG study was normal.  At this point, we do not have a good clear explanation for his symptoms.  I feel confident that there is nothing dangerous going on, however, I want him to follow-up with his primary doctor, Dr. Acosta in November to recheck his symptoms and discuss if there is any additional testing we should be considering     65y  Female with h/o Adenocarcinoma of the Lung s/p chemo (carbo-taxol and RT - completed June 2021), History of PNX, Anxiety, Depression, Forgetfulness, Diverticulitis, Positive PPD (treated with INH in 2000), Pulmonary Nodules, Benign Colon Mass s/p resection, s/p Appendectomy, and s/p breast implants. Patient presented to the ED with complaints of worsening SOB and dry cough. Patient states she completed chemo/RT at the end of June (follows with Dr. Simpson) and was scheduled to start immunotherapy. However, she developed a dry cough 2 weeks ago. States the cough was then associated with SOB and worsened for which he followed up with Dr. Simpson on 7/28 and was prescribed Azithromycin and a prednisone taper. States she was also told her pulse ox was 86% at that time and home O2 was arranged but never delivered. Over 4 days prior to presentation her SOB and cough continued to worsen which prompted her to report to the ED. In the ED, patient had a normal WBC count, afebrile, CXR was suspicious of left middle lobe pneumonia for which the patient received Cefepime and Doxycycline. CT chest angio was negative for PE but revealed a new left middle lobe cavitation since last month which was thought to be due to a treatment response.       Radiation Pneumonitis vs PNA  Adenocarcinoma of the Lung s/p chemo (carbo-taxol and RT - completed June 2021)  Immunosuppressed due to chemotherapy      - Blood cultures no growth   - RVP/COVID 19 PCR negative  - Urine for legionella negative   - CT Chest angio, reporting pneumonitis vs PNA  - Procalcitonin level 0.06  - Continue Cefepime  - D/C Doxycycline     - Follow up cultures  - Last day of antibiotics 8/6/21  - Likely pneumonitis  - Recommend steroids  - pulmonary following  - Trend Fever  - Trend WBC      Thank you for allowing me to participate in the care of your patient.   Will Follow    d/w Dr Holguin and Dr Zayas

## 2021-10-13 NOTE — PROGRESS NOTES
Patient presents at the request of Dean Quintana CNP for bilateral upper extremity EMG.  He has 6 to 7-month history of intermittent numbness and tingling diffusely throughout all the fingers of both hands right worse than left.  He is right-handed.  This is since Covid infection last March.  On exam he has normal sensation to light touch both the upper extremities, reflexes 1+ symmetric throughout the upper extremities and normal strength throughout the major muscle groups of the upper extremities.    EMG/NCS  results: Please see scanned full report    Comment NCS: Normal study  1.  Normal nerve conduction studies bilateral upper extremities.  This includes normal right median and ulnar SNAPs, bilateral median and ulnar transcarpal studies, and bilateral median and ulnar CMAPs.  Normal left ulnar F-wave.    Comment EMG: Normal study  1.  Normal needle EMG bilateral upper extremities.    Interpretation: Normal study    1. There is no electrodiagnostic evidence of cervical radiculopathy, brachial plexopathy, or focal neuropathy in the bilateral upper extremities.  Specifically, there is no electrodiagnostic evidence of median or ulnar neuropathy in the bilateral upper extremities.     The testing was completed in its entirety by the physician.       It was our pleasure caring for your patient today, if there any questions or concerns please do not hesitate to contact us.

## 2021-10-13 NOTE — TELEPHONE ENCOUNTER
Spoke to patient and relayed the message below from Alexis Quintana. Patient already has follow up with Dr Dave franz.    Lynette Brink, MOUSTAPHA                Please call the patient and let him know that I reviewed the results of his EMG study.  His EMG study was normal.  At this point, we do not have a good clear explanation for his symptoms.  I feel confident that there is nothing dangerous going on, however, I want him to follow-up with his primary doctor, Dr. Acosta in November to recheck his symptoms and discuss if there is any additional testing we should be considering

## 2021-10-14 LAB — METHYLMALONATE SERPL-SCNC: 0.21 UMOL/L (ref 0–0.4)

## 2021-10-16 ENCOUNTER — HEALTH MAINTENANCE LETTER (OUTPATIENT)
Age: 31
End: 2021-10-16

## 2021-11-04 ENCOUNTER — OFFICE VISIT (OUTPATIENT)
Dept: INTERNAL MEDICINE | Facility: CLINIC | Age: 31
End: 2021-11-04
Payer: COMMERCIAL

## 2021-11-04 VITALS
DIASTOLIC BLOOD PRESSURE: 72 MMHG | HEART RATE: 46 BPM | BODY MASS INDEX: 27.37 KG/M2 | WEIGHT: 191.2 LBS | HEIGHT: 70 IN | OXYGEN SATURATION: 99 % | SYSTOLIC BLOOD PRESSURE: 108 MMHG

## 2021-11-04 DIAGNOSIS — Z23 HIGH PRIORITY FOR 2019-NCOV VACCINE: Primary | ICD-10-CM

## 2021-11-04 PROCEDURE — 90471 IMMUNIZATION ADMIN: CPT | Performed by: INTERNAL MEDICINE

## 2021-11-04 PROCEDURE — 90686 IIV4 VACC NO PRSV 0.5 ML IM: CPT | Performed by: INTERNAL MEDICINE

## 2021-11-04 PROCEDURE — 99395 PREV VISIT EST AGE 18-39: CPT | Mod: 25 | Performed by: INTERNAL MEDICINE

## 2021-11-04 ASSESSMENT — MIFFLIN-ST. JEOR: SCORE: 1823.53

## 2021-11-04 NOTE — PROGRESS NOTES
Office Visit - Follow Up   Sheldon Downing   31 year old male    Date of Visit: 2021    Chief Complaint   Patient presents with     Physical     Not fasting - no concerns     Imm/Inj     COVID-19 VACCINE        -------------------------------------------------------------------------------------------------------------------------  Assessment and Plan    Annual preventive exam 31-year-old man, who works as a [employer] and .   He has been historically very healthy     New baby arrived in 2020    Numbness in hands, possibly as a manifestation after Covid infection  Happened at time he tested positive for COVID  EMG 10- NORMAL  Waxing and waning over several months  He has developed multiple sclerosis, but I do not think we need to investigate yet.  I told her to go looking for other symptoms such as visual disturbances, focal weakness, incoordination, unusual pains.    History COVID 19 positive 2021  Tested because he was at a wedding, where attendees later tested positive  But he felt OK  He is pondering whether to get a Covid vaccine, and I think it is okay for him to wait and see.  I told him that most patients who have recovered from Covid infection do indeed get vaccinated with no problems.    Asymptomatic carrier for cystic fibrosis mutation [id number], which was detected when his  baby was found to have 1 copy of the gene.  He was told by the genetic laboratory that the gene has been associated with increased risk of pancreatic cancer.  Unclear how to use this information at this point in time.  I told him that there is currently no generally accepted screening strategy for pancreatic cancer.  Very importantly, he should maintain healthy weight and avoid future type 2 diabetes, and also keep alcohol consumption to a minimum, since these are all measures that are known to help reduce risk for pancreatic cancer.     Family history of colon polyps (father).  Father had several  large polyps, which could not be removed endoscopically and needed surgery.  Unclear whether there are other family members with polyps.  At the moment, there is not yet an established familial polyposis problem.  I suggested to Nikhil that he could start colon cancer screening earlier, meaning at age 40.  That could be done with a colonoscopy.     Favorable cardiovascular risk profile, although there is family history of heart disease in grandparents.  Nikhil himself has had excellent lipid profiles and 2017, 2018, and 2019.  I told him that there is no need to check lipid profile this year.  Blood pressures great.  He exercises vigorously.  No smoking.  At this point, simply maintain healthy lifestyle.     Lipid panel from August 2019 had total 169, triglycerides 72, HDL 51,     Elevated body mass index, but he has large muscle mass    He will get a flu shot today November 4, 2021  tetanus is up-to-date    Immunization History   Administered Date(s) Administered     DTAP (<7y) 1989, 02/06/1990, 03/23/1990, 03/21/1991, 09/26/1994     HPV Quadrivalent 07/09/2010, 09/17/2010, 05/09/2011     Hep B, Peds or Adolescent 04/15/1994, 05/31/1994, 10/13/1994     Influenza (IIV3) PF 09/28/2009, 10/07/2010, 10/13/2011     Influenza Vaccine IM > 6 months Valent IIV4 (Alfuria,Fluzone) 11/05/2020     MMR 12/27/1990, 08/21/2001     Meningococcal (Menactra ) 07/17/2007     Pedvax-hib 12/27/1990     Poliovirus, inactivated (IPV) 1989, 02/06/1990, 03/21/1991, 09/26/1994     TD (ADULT, 7+) 12/30/2003     Tdap (Adacel,Boostrix) 06/17/2008, 08/08/2019     --------------------------------------------------------------------------------------------------------------------------  History of Present Illness  This 31 year old old     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   He has been historically very healthy     New baby arrived in July 2020    Numbness in hands, possibly as a  manifestation after Covid infection  Happened at time he tested positive for COVID  EMG 10- NORMAL  Waxing and waning over several months  He has developed multiple sclerosis, but I do not think we need to investigate yet.  I told her to go looking for other symptoms such as visual disturbances, focal weakness, incoordination, unusual pains.    History COVID 19 positive March 2021  Tested because he was at a wedding, where attendees later tested positive  But he felt OK  He is pondering whether to get a Covid vaccine, and I think it is okay for him to wait and see.  I told him that most patients who have recovered from Covid infection do indeed get vaccinated with no problems.      Wt Readings from Last 3 Encounters:   11/04/21 86.7 kg (191 lb 3.2 oz)   10/06/21 84.9 kg (187 lb 2 oz)   07/18/21 86.6 kg (191 lb)     BP Readings from Last 3 Encounters:   11/04/21 108/72   10/06/21 116/86   07/18/21 118/74       Review of Systems: A comprehensive review of systems was negative except as noted.  ---------------------------------------------------------------------------------------------------------------------------    Medications, Allergies, Social, and Problem List   Current Outpatient Medications   Medication Sig Dispense Refill     cholecalciferol, vitamin D3, 1,000 unit (25 mcg) tablet [CHOLECALCIFEROL, VITAMIN D3, 1,000 UNIT (25 MCG) TABLET] Take 2,000 Units by mouth daily.       MULTIVITAMIN ORAL [MULTIVITAMIN ORAL] Take by mouth daily.              vitamin C (ASCORBIC ACID) 1000 MG TABS Take 1,000 mg by mouth daily       zinc gluconate 30 mg Tab [ZINC GLUCONATE 30 MG TAB] Take by mouth.       Allergies   Allergen Reactions     Ceclor [Cefaclor] Hives     Social History     Tobacco Use     Smoking status: Never Smoker     Smokeless tobacco: Former User     Types: Chew, Chew   Substance Use Topics     Alcohol use: Yes     Comment: Occassional     Drug use: Never     Patient Active Problem List   Diagnosis  "    Cystic fibrosis carrier-- [id number], detected July 2020 with birth of son     Family history of colonic polyps-- father      Reviewed, reconciled and updated       Physical Exam   General Appearance:      /72 (BP Location: Right arm, Patient Position: Sitting)   Pulse (!) 46   Ht 1.778 m (5' 10\")   Wt 86.7 kg (191 lb 3.2 oz)   SpO2 99%   BMI 27.43 kg/m      General: Alert, in no distress  Skin: No significant lesion seen.  Eyes/nose/throat: Eyes without scleral icterus, eye movements normal, pupils equal and reactive, oropharynx clear, ears with normal TM's  MSK: Neck with good ROM  Lymphatic: Neck without adenopathy or masses  Endocrine: Thyroid with no nodules to palpation  Pulm: Lungs clear to auscultation bilaterally  Cardiac: Heart with regular rate and rhythm, no murmur or gallop  GI: Abdomen soft, nontender. No palpable enlargement of liver or spleen  MSK: Extremities no tenderness or edema  Neuro: Moves all extremities, without focal weakness  Psych: Alert, normal mental status. Normal affect and speech       Additional Information        LUANA SEWELL MD, MD    Answers for HPI/ROS submitted by the patient on 11/4/2021  Frequency of exercise:: 6-7 days/week  Getting at least 3 servings of Calcium per day:: Yes  Diet:: Breakfast skipped, Other  Taking medications regularly:: Yes  Medication side effects:: None  Bi-annual eye exam:: NO  Dental care twice a year:: NO  Sleep apnea or symptoms of sleep apnea:: Daytime drowsiness  Additional concerns today:: No  Duration of exercise:: 45-60 minutes      "

## 2021-11-04 NOTE — PATIENT INSTRUCTIONS
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   He has been historically very healthy     New baby arrived in 2020    Numbness in hands, possibly as a manifestation after Covid infection  Happened at time he tested positive for COVID  EMG 10- NORMAL  Waxing and waning over several months  He has developed multiple sclerosis, but I do not think we need to investigate yet.  I told her to go looking for other symptoms such as visual disturbances, focal weakness, incoordination, unusual pains.    History COVID 19 positive 2021  Tested because he was at a wedding, where attendees later tested positive  But he felt OK  He is pondering whether to get a Covid vaccine, and I think it is okay for him to wait and see.  I told him that most patients who have recovered from Covid infection do indeed get vaccinated with no problems.    Asymptomatic carrier for cystic fibrosis mutation P4187O, which was detected when his  baby was found to have 1 copy of the gene.  He was told by the genetic laboratory that the gene has been associated with increased risk of pancreatic cancer.  Unclear how to use this information at this point in time.  I told him that there is currently no generally accepted screening strategy for pancreatic cancer.  Very importantly, he should maintain healthy weight and avoid future type 2 diabetes, and also keep alcohol consumption to a minimum, since these are all measures that are known to help reduce risk for pancreatic cancer.     Family history of colon polyps (father).  Father had several large polyps, which could not be removed endoscopically and needed surgery.  Unclear whether there are other family members with polyps.  At the moment, there is not yet an established familial polyposis problem.  I suggested to Nikhil that he could start colon cancer screening earlier, meaning at age 40.  That could be done with a colonoscopy.     Favorable  cardiovascular risk profile, although there is family history of heart disease in grandparents.  Nikhil himself has had excellent lipid profiles and 2017, 2018, and 2019.  I told him that there is no need to check lipid profile this year.  Blood pressures great.  He exercises vigorously.  No smoking.  At this point, simply maintain healthy lifestyle.     Lipid panel from August 2019 had total 169, triglycerides 72, HDL 51,     Elevated body mass index, but he has large muscle mass    He will get a flu shot today November 4, 2021  tetanus is up-to-date

## 2022-01-27 ENCOUNTER — E-VISIT (OUTPATIENT)
Dept: INTERNAL MEDICINE | Facility: CLINIC | Age: 32
End: 2022-01-27
Payer: COMMERCIAL

## 2022-01-27 DIAGNOSIS — R00.2 PALPITATIONS: Primary | ICD-10-CM

## 2022-01-27 PROCEDURE — 99207 PR NON-BILLABLE SERV PER CHARTING: CPT | Performed by: INTERNAL MEDICINE

## 2022-09-25 ENCOUNTER — HEALTH MAINTENANCE LETTER (OUTPATIENT)
Age: 32
End: 2022-09-25

## 2023-02-04 ENCOUNTER — HEALTH MAINTENANCE LETTER (OUTPATIENT)
Age: 33
End: 2023-02-04

## 2023-06-01 ASSESSMENT — ENCOUNTER SYMPTOMS
DYSURIA: 0
DIARRHEA: 0
SORE THROAT: 0
ABDOMINAL PAIN: 0
FREQUENCY: 0
PALPITATIONS: 0
DIZZINESS: 0
NAUSEA: 0
ARTHRALGIAS: 0
HEMATOCHEZIA: 0
FEVER: 0
HEADACHES: 0
JOINT SWELLING: 0
SHORTNESS OF BREATH: 0
WEAKNESS: 0
CONSTIPATION: 0
HEMATURIA: 0
COUGH: 0
EYE PAIN: 0
HEARTBURN: 0
PARESTHESIAS: 0
NERVOUS/ANXIOUS: 0
CHILLS: 0
MYALGIAS: 0

## 2023-06-08 ENCOUNTER — OFFICE VISIT (OUTPATIENT)
Dept: INTERNAL MEDICINE | Facility: CLINIC | Age: 33
End: 2023-06-08
Payer: COMMERCIAL

## 2023-06-08 VITALS
HEIGHT: 70 IN | DIASTOLIC BLOOD PRESSURE: 64 MMHG | RESPIRATION RATE: 12 BRPM | HEART RATE: 44 BPM | TEMPERATURE: 97.7 F | OXYGEN SATURATION: 98 % | SYSTOLIC BLOOD PRESSURE: 110 MMHG | WEIGHT: 186.3 LBS | BODY MASS INDEX: 26.67 KG/M2

## 2023-06-08 DIAGNOSIS — Z83.719 FAMILY HISTORY OF COLONIC POLYPS: ICD-10-CM

## 2023-06-08 DIAGNOSIS — L98.9 SKIN LESION: ICD-10-CM

## 2023-06-08 DIAGNOSIS — Z00.00 ANNUAL PHYSICAL EXAM: Primary | ICD-10-CM

## 2023-06-08 DIAGNOSIS — Z82.49 FAMILY HISTORY OF CORONARY ARTERY DISEASE: ICD-10-CM

## 2023-06-08 DIAGNOSIS — Z13.1 SCREENING FOR DIABETES MELLITUS: ICD-10-CM

## 2023-06-08 LAB
ALBUMIN SERPL BCG-MCNC: 4.5 G/DL (ref 3.5–5.2)
ALP SERPL-CCNC: 61 U/L (ref 40–129)
ALT SERPL W P-5'-P-CCNC: 40 U/L (ref 10–50)
ANION GAP SERPL CALCULATED.3IONS-SCNC: 8 MMOL/L (ref 7–15)
APO A-I SERPL-MCNC: <6 MG/DL
AST SERPL W P-5'-P-CCNC: 33 U/L (ref 10–50)
BILIRUB SERPL-MCNC: 0.7 MG/DL
BUN SERPL-MCNC: 16.3 MG/DL (ref 6–20)
CALCIUM SERPL-MCNC: 9.5 MG/DL (ref 8.6–10)
CHLORIDE SERPL-SCNC: 107 MMOL/L (ref 98–107)
CHOLEST SERPL-MCNC: 163 MG/DL
CREAT SERPL-MCNC: 0.94 MG/DL (ref 0.67–1.17)
DEPRECATED HCO3 PLAS-SCNC: 26 MMOL/L (ref 22–29)
GFR SERPL CREATININE-BSD FRML MDRD: >90 ML/MIN/1.73M2
GLUCOSE SERPL-MCNC: 95 MG/DL (ref 70–99)
HBA1C MFR BLD: 5.2 % (ref 0–5.6)
HDLC SERPL-MCNC: 73 MG/DL
LDLC SERPL CALC-MCNC: 81 MG/DL
NONHDLC SERPL-MCNC: 90 MG/DL
POTASSIUM SERPL-SCNC: 4.3 MMOL/L (ref 3.4–5.3)
PROT SERPL-MCNC: 6.9 G/DL (ref 6.4–8.3)
SODIUM SERPL-SCNC: 141 MMOL/L (ref 136–145)
TRIGL SERPL-MCNC: 45 MG/DL

## 2023-06-08 PROCEDURE — 80061 LIPID PANEL: CPT | Performed by: NURSE PRACTITIONER

## 2023-06-08 PROCEDURE — 83695 ASSAY OF LIPOPROTEIN(A): CPT | Performed by: NURSE PRACTITIONER

## 2023-06-08 PROCEDURE — 99395 PREV VISIT EST AGE 18-39: CPT | Performed by: NURSE PRACTITIONER

## 2023-06-08 PROCEDURE — 99214 OFFICE O/P EST MOD 30 MIN: CPT | Mod: 25 | Performed by: NURSE PRACTITIONER

## 2023-06-08 PROCEDURE — 83036 HEMOGLOBIN GLYCOSYLATED A1C: CPT | Performed by: NURSE PRACTITIONER

## 2023-06-08 PROCEDURE — 36415 COLL VENOUS BLD VENIPUNCTURE: CPT | Performed by: NURSE PRACTITIONER

## 2023-06-08 PROCEDURE — 80053 COMPREHEN METABOLIC PANEL: CPT | Performed by: NURSE PRACTITIONER

## 2023-06-08 ASSESSMENT — ENCOUNTER SYMPTOMS
FREQUENCY: 0
JOINT SWELLING: 0
CHILLS: 0
HEADACHES: 0
DYSURIA: 0
NAUSEA: 0
ARTHRALGIAS: 0
SORE THROAT: 0
EYE PAIN: 0
ABDOMINAL PAIN: 0
FEVER: 0
PARESTHESIAS: 0
DIARRHEA: 0
DIZZINESS: 0
MYALGIAS: 0
NERVOUS/ANXIOUS: 0
HEMATOCHEZIA: 0
HEMATURIA: 0
PALPITATIONS: 0
SHORTNESS OF BREATH: 0
CONSTIPATION: 0
HEARTBURN: 0
COUGH: 0
WEAKNESS: 0

## 2023-06-08 NOTE — PATIENT INSTRUCTIONS
Blood pressure and other vital signs look good.    Weight is excellent.    Dermatology consultants: [phone].    Colonoscopies can begin at age 40, given your family history.    Call [phone] to set up the coronary artery calcium scan.  Results will come through [employer].    Lab work should hit [employer] over the next few days.    Follow-up in 1 year, sooner if needed

## 2023-06-08 NOTE — PROGRESS NOTES
SUBJECTIVE:   CC: Sheldon is an 32 year old who presents for preventative health visit.       6/8/2023     8:19 AM   Additional Questions   Roomed by      Healthy Habits:     Getting at least 3 servings of Calcium per day:  Yes    Bi-annual eye exam:  Yes    Dental care twice a year:  Yes    Sleep apnea or symptoms of sleep apnea:  None    Diet:  Regular (no restrictions)    Frequency of exercise:  6-7 days/week    Duration of exercise:  Greater than 60 minutes    Taking medications regularly:  Yes    Medication side effects:  Not applicable    PHQ-2 Total Score: 0    Additional concerns today:  No          Today's PHQ-2 Score:       6/8/2023     8:15 AM   PHQ-2 ( 1999 Pfizer)   Q1: Little interest or pleasure in doing things 0   Q2: Feeling down, depressed or hopeless 0   PHQ-2 Score 0   Q1: Little interest or pleasure in doing things Not at all   Q2: Feeling down, depressed or hopeless Not at all   PHQ-2 Score 0       Have you ever done Advance Care Planning? (For example, a Health Directive, POLST, or a discussion with a medical provider or your loved ones about your wishes): Yes, patient states has an Advance Care Planning document and will bring a copy to the clinic.    Social History     Tobacco Use     Smoking status: Never     Smokeless tobacco: Former     Types: Chew   Vaping Use     Vaping status: Not on file   Substance Use Topics     Alcohol use: Yes     Comment: Occassional             6/1/2023    10:47 AM   Alcohol Use   Prescreen: >3 drinks/day or >7 drinks/week? Yes   AUDIT SCORE  6       Last PSA: No results found for: PSA    Reviewed orders with patient. Reviewed health maintenance and updated orders accordingly - Yes  Lab work is in process    Reviewed and updated as needed this visit by clinical staff   Tobacco  Allergies  Meds              Reviewed and updated as needed this visit by Provider                 No past surgical history on file.    Review of Systems   Constitutional: Negative for  "chills and fever.   HENT: Negative for congestion, ear pain, hearing loss and sore throat.    Eyes: Negative for pain and visual disturbance.   Respiratory: Negative for cough and shortness of breath.    Cardiovascular: Negative for chest pain, palpitations and peripheral edema.   Gastrointestinal: Negative for abdominal pain, constipation, diarrhea, heartburn, hematochezia and nausea.   Genitourinary: Negative for dysuria, frequency, genital sores, hematuria, impotence, penile discharge and urgency.   Musculoskeletal: Negative for arthralgias, joint swelling and myalgias.   Skin: Negative for rash.   Neurological: Negative for dizziness, weakness, headaches and paresthesias.   Psychiatric/Behavioral: Negative for mood changes. The patient is not nervous/anxious.      CONSTITUTIONAL: NEGATIVE for fever, chills, change in weight  INTEGUMENTARY/SKIN: NEGATIVE for worrisome rashes, moles or lesions  EYES: NEGATIVE for vision changes or irritation  ENT: NEGATIVE for ear, mouth and throat problems  RESP: NEGATIVE for significant cough or SOB  CV: NEGATIVE for chest pain, palpitations or peripheral edema  GI: NEGATIVE for nausea, abdominal pain, heartburn, or change in bowel habits   male: negative for dysuria, hematuria, decreased urinary stream, erectile dysfunction, urethral discharge  MUSCULOSKELETAL: NEGATIVE for significant arthralgias or myalgia  NEURO: NEGATIVE for weakness, dizziness or paresthesias  PSYCHIATRIC: NEGATIVE for changes in mood or affect    OBJECTIVE:   /64 (BP Location: Right arm, Patient Position: Sitting, Cuff Size: Adult Regular)   Pulse (!) 44   Temp 97.7  F (36.5  C) (Oral)   Resp 12   Ht 1.778 m (5' 10\")   Wt 84.5 kg (186 lb 4.8 oz)   SpO2 98%   BMI 26.73 kg/m      Physical Exam  GENERAL: healthy, alert and no distress  NECK: no adenopathy, no asymmetry, masses, or scars and thyroid normal to palpation  RESP: lungs clear to auscultation - no rales, rhonchi or wheezes  CV: " regular rate and rhythm, normal S1 S2, no S3 or S4, no murmur, click or rub, no peripheral edema and peripheral pulses strong  ABDOMEN: soft, nontender, no hepatosplenomegaly, no masses and bowel sounds normal  MS: no gross musculoskeletal defects noted, no edema    Diagnostic Test Results:  Labs reviewed in Epic    ASSESSMENT/PLAN:     1. Annual physical exam  Overall, patient is a very healthy 32-year-old male.  Enjoys gravel biking and mountain biking on a regular basis.    2. Family history of coronary artery disease  Family history of coronary artery disease in both his father and mother side.  He is interested in CT calcium score but would also like a lipoprotein a checked    - Lipoprotein (a); Future  - Comprehensive metabolic panel; Future  - Lipid Profile (Chol, Trig, HDL, LDL calc); Future  - CT Coronary Calcium Scan; Future    3. Family history of colonic polyps  Colon cancer screening can begin at age 40, given that his father has had extensive polyps removed    4. Screening for diabetes mellitus  - Hemoglobin A1c; Future    5. Skin lesion  He like a general skin check with dermatology.  He does have a variety of benign appearing nevi  - Adult Dermatology Referral; Future    Patient Instructions   Blood pressure and other vital signs look good.    Weight is excellent.    Dermatology consultants: [phone].    Colonoscopies can begin at age 40, given your family history.    Call [phone] to set up the coronary artery calcium scan.  Results will come through [employer].    Lab work should hit [employer] over the next few days.    Follow-up in 1 year, sooner if needed        Patient has been advised of split billing requirements and indicates understanding: Yes      COUNSELING:   Reviewed preventive health counseling, as reflected in patient instructions       Regular exercise       Healthy diet/nutrition       Vision screening       Hearing screening      BMI:   Estimated body mass index is 26.73 kg/m  as  "calculated from the following:    Height as of this encounter: 1.778 m (5' 10\").    Weight as of this encounter: 84.5 kg (186 lb 4.8 oz).   Weight management plan: Discussed healthy diet and exercise guidelines      He reports that he has never smoked. He has quit using smokeless tobacco.  His smokeless tobacco use included chew and chew.        Dean Quintana [facility]  "

## 2023-07-18 ENCOUNTER — TRANSFERRED RECORDS (OUTPATIENT)
Dept: HEALTH INFORMATION MANAGEMENT | Facility: CLINIC | Age: 33
End: 2023-07-18
Payer: COMMERCIAL

## 2023-11-21 ENCOUNTER — MYC MEDICAL ADVICE (OUTPATIENT)
Dept: INTERNAL MEDICINE | Facility: CLINIC | Age: 33
End: 2023-11-21
Payer: COMMERCIAL

## 2023-12-18 NOTE — COMMUNITY RESOURCES LIST (ENGLISH)
12/18/2023   [facility]  N/A  For questions about this resource list or additional care needs, please contact your primary care clinic or care manager.  Phone: [phone]   Email: N/A   Address: [facility] [place] [address]   Hours: N/A        Hotlines and Helplines       Hotline - Housing crisis  1  [facility] (Main Office) Distance: 8.66 miles      Phone/Virtual   [address] [place] [address]  Language: English  Hours: Mon - Sun Open 24 Hours   Phone: (939) 250-4242 Email: info@[facility] Website: http://[facility].[employer]     2  [facility] Distance: 9.5 miles      Phone/Virtual   [address] [place] [address]  Language: English  Hours: Mon - Sun Open 24 Hours   Phone: (407) 632-5487 Email: communications@[facility].org Website: https://[facility]-[place].org/oursaviourshousing/          Housing       Coordinated Entry access point  3  [facility] Distance: 3.64 miles      In-Person, Phone/Virtual   [address] [place] [address]  Language: English  Hours: Mon - Fri 8:30 AM - 4:30 PM  Fees: Free   Phone: (495) 593-6068 Email: info@[facility].org Website: https://www.[facility].org/locations/[facility]/     4  [facility] ([facility] - Housing Services Distance: 9.6 miles      In-Person   [address] [place] [address]  Language: English  Hours: Mon - Fri 9:00 AM - 5:00 PM  Fees: Free   Phone: (903) 653-5705 Email: housing@[facility].org Website: http://www.[facility].org/housing     Drop-in center or day shelter  5  [facility] Distance: 4.51 miles      In-Person   [address] [place] [address]  Language: English  Hours: Mon - Fri 9:00 AM - 4:00 PM  Fees: Free   Phone: (131) 449-9556 Email: chad@[employer].org Website: http://listeninghouse.org     6  [facility] Distance: 9.74 miles       In-Person   [address] [place] [address]  Language: English, Nigerien, Afghan  Hours: Mon - Sat 7:00 AM - 3:00 PM  Fees: Free, Self Pay   Phone: (373) 752-5353 Email: info@[employer] Website: https://www.[employer]/locations/opportunity-center/     Housing search assistance  7  [facility] Aging and Disability Services Distance: 0.76 miles      In-Person   [address] [place] [address]  Language: English  Hours: Mon - Fri 8:00 AM - 4:00 PM  Fees: Free, Insurance, Sliding Fee   Phone: (135) 138-2858 Email: supriya@[employer].[facility] Website: https://www.[facility]./HealthFamily/Disabilities     8  [facility] - Online Housing Search Assistance Distance: 2.91 miles      Phone/Virtual [address] [place] [address]  Language: English  Hours: Mon - Sun Open 24 Hours  Fees: Free   Phone: (606) 760-8870 Email: kam@[employer] Website: https://[employer]/     Shelter for families  9  [facility] Family Shel[facility] Distance: 4.86 miles      In-Person   [address] [place] [address]  Language: English  Hours: Mon - Sun Open 24 Hours  Fees: Free, Sliding Fee   Phone: (771) 948-1477 Ext.1 Email: info@[facility] Website: http://www.[facility] Hospitalm2fx.[employer]omer     10  [facility] Distance: 18.78 miles      In-Person   [address] [place] [address]  Language: English  Hours: Mon - Fri 3:00 PM - 9:00 AM , Sat - Sun Open 24 Hours  Fees: Free   Phone: (202) 746-1687 Ext.1 Website: https://www.[facility].org/2020/07/03/emergency-family-shelter/     Shelter for individuals  11  [facility] [place] [facility] [place] [facility] Distance: 3.66 miles      In-Person   [address] [place] [address]  Language: English  Hours: Mon - Sun 5:00 PM - 10:00 AM  Fees: Free, Self Pay   Phone: (165) 147-1617 Email: info@[employer].org  Website: https://www.[facility].org/locations/[facility]/     12  [facility] Distance: 9.5 miles      In-Person   [address] [place] [address]  Language: English  Hours: Mon - Sun Open 24 Hours  Fees: Free   Phone: (658) 133-2104 Email: communications@[facility].org Website: https://[facility].org/oursaviourshousing/          Important Numbers & Websites       Emergency Services   [address]  [place] [address]  Poison Control   (222) 315-1446  Suicide Prevention Lifeline   (422) 242-8804 (TALK)  Child Abuse Hotline   (215) 399-7482 (4-A-Child)  Sexual Assault Hotline   (270) 622-5162 (HOPE)  National Runaway Safeline   (760) 357-5847 (RUNAWAY)  All-Options Talkline   (160) 242-1943  Substance Abuse Referral   (380) 565-5960 (HELP)

## 2023-12-19 ENCOUNTER — OFFICE VISIT (OUTPATIENT)
Dept: INTERNAL MEDICINE | Facility: CLINIC | Age: 33
End: 2023-12-19
Payer: COMMERCIAL

## 2023-12-19 VITALS
RESPIRATION RATE: 12 BRPM | HEART RATE: 48 BPM | SYSTOLIC BLOOD PRESSURE: 110 MMHG | WEIGHT: 188 LBS | HEIGHT: 70 IN | TEMPERATURE: 98 F | OXYGEN SATURATION: 98 % | BODY MASS INDEX: 26.92 KG/M2 | DIASTOLIC BLOOD PRESSURE: 68 MMHG

## 2023-12-19 DIAGNOSIS — Z13.228 SCREENING FOR METABOLIC DISORDER: Primary | ICD-10-CM

## 2023-12-19 DIAGNOSIS — Z82.49 FAMILY HISTORY OF CORONARY ARTERY DISEASE: ICD-10-CM

## 2023-12-19 LAB
ALBUMIN SERPL BCG-MCNC: 4.6 G/DL (ref 3.5–5.2)
ALP SERPL-CCNC: 69 U/L (ref 40–150)
ALT SERPL W P-5'-P-CCNC: 48 U/L (ref 0–70)
ANION GAP SERPL CALCULATED.3IONS-SCNC: 9 MMOL/L (ref 7–15)
AST SERPL W P-5'-P-CCNC: 30 U/L (ref 0–45)
BILIRUB SERPL-MCNC: 0.5 MG/DL
BUN SERPL-MCNC: 14.9 MG/DL (ref 6–20)
CALCIUM SERPL-MCNC: 10 MG/DL (ref 8.6–10)
CHLORIDE SERPL-SCNC: 103 MMOL/L (ref 98–107)
CHOLEST SERPL-MCNC: 168 MG/DL
CREAT SERPL-MCNC: 1.04 MG/DL (ref 0.67–1.17)
DEPRECATED HCO3 PLAS-SCNC: 29 MMOL/L (ref 22–29)
EGFRCR SERPLBLD CKD-EPI 2021: >90 ML/MIN/1.73M2
FASTING STATUS PATIENT QL REPORTED: YES
GLUCOSE SERPL-MCNC: 102 MG/DL (ref 70–99)
HDLC SERPL-MCNC: 57 MG/DL
LDLC SERPL CALC-MCNC: 94 MG/DL
NONHDLC SERPL-MCNC: 111 MG/DL
POTASSIUM SERPL-SCNC: 4.3 MMOL/L (ref 3.4–5.3)
PROT SERPL-MCNC: 7.1 G/DL (ref 6.4–8.3)
SHBG SERPL-SCNC: 40 NMOL/L (ref 11–80)
SODIUM SERPL-SCNC: 141 MMOL/L (ref 135–145)
TRIGL SERPL-MCNC: 85 MG/DL

## 2023-12-19 PROCEDURE — 80053 COMPREHEN METABOLIC PANEL: CPT | Performed by: NURSE PRACTITIONER

## 2023-12-19 PROCEDURE — 82172 ASSAY OF APOLIPOPROTEIN: CPT | Mod: 90 | Performed by: NURSE PRACTITIONER

## 2023-12-19 PROCEDURE — 99213 OFFICE O/P EST LOW 20 MIN: CPT | Performed by: NURSE PRACTITIONER

## 2023-12-19 PROCEDURE — 36415 COLL VENOUS BLD VENIPUNCTURE: CPT | Performed by: NURSE PRACTITIONER

## 2023-12-19 PROCEDURE — 99000 SPECIMEN HANDLING OFFICE-LAB: CPT | Performed by: NURSE PRACTITIONER

## 2023-12-19 PROCEDURE — 84270 ASSAY OF SEX HORMONE GLOBUL: CPT | Performed by: NURSE PRACTITIONER

## 2023-12-19 PROCEDURE — 84403 ASSAY OF TOTAL TESTOSTERONE: CPT | Performed by: NURSE PRACTITIONER

## 2023-12-19 PROCEDURE — 80061 LIPID PANEL: CPT | Performed by: NURSE PRACTITIONER

## 2023-12-19 NOTE — PROGRESS NOTES
"  Assessment & Plan     Screening for metabolic disorder  Very healthy and active patient, looking to establish baseline testosterone levels and check apolipoprotein B  - Testosterone Free and Total; Future  - Apolipoprotein B; Future    Family history of coronary artery disease  Paternal grandfather  of ischemic heart disease in his 50s.  I did recommend that he move forward with the coronary artery calcium scan.  He would also like to recheck his liver enzymes and lipid profile    - Comprehensive metabolic panel; Future  - Lipid panel; Future    Patient Instructions   Call [phone] to set up the coronary artery calcium scan.    Labs today.  Results come through [employer]      Dean Quintana CNP  [facility]    Kerry Chung is a 33 year old, presenting for the following health issues:    Lab Result Notice (Follow up lab, had life insurance tests done, follow up, would like some additional tests done too/ fasting)      2023     9:13 AM   Additional Questions   Roomed by Emily HAMILTON       History of Present Illness       Reason for visit:  Follow up on labs received as part of life insurance exam    He eats 4 or more servings of fruits and vegetables daily.He consumes 0 sweetened beverage(s) daily.He exercises with enough effort to increase his heart rate 60 or more minutes per day.  He exercises with enough effort to increase his heart rate 6 days per week.   He is taking medications regularly.           Review of Systems   Constitutional, HEENT, cardiovascular, pulmonary, gi and gu systems are negative, except as otherwise noted.      Objective    /68 (BP Location: Right arm, Patient Position: Sitting, Cuff Size: Adult Regular)   Pulse (!) 48   Temp 98  F (36.7  C)   Resp 12   Ht 1.778 m (5' 10\")   Wt 85.3 kg (188 lb)   SpO2 98%   BMI 26.98 kg/m    Body mass index is 26.98 kg/m .  Physical Exam   Patient healthy appearing and in no acute distress    "

## 2023-12-19 NOTE — PATIENT INSTRUCTIONS
Call [phone] to set up the coronary artery calcium scan.    Labs today.  Results come through [employer].

## 2023-12-19 NOTE — COMMUNITY RESOURCES LIST (ENGLISH)
12/19/2023   [facility] - Outpatient Clinics  N/A  For additional resource needs, please contact your health insurance member services or your primary care team.  Phone: [phone]   Email: N/A   Address: [address] [place] [address]   Hours: N/A        Hotlines and Helplines       Hotline - Housing crisis  1  [facility] (Main Office) Distance: 8.66 miles      Phone/Virtual   [address] [place] [address]  Language: English  Hours: Mon - Sun Open 24 Hours   Phone: (490) 661-1611 Email: info@[facility] Website: http://[facility].[employer]     2  [facility] Distance: 9.5 miles      Phone/Virtual   [address] [place] [address]  Language: English  Hours: Mon - Sun Open 24 Hours   Phone: (666) 682-6663 Email: communications@[facility].org Website: https://[facility]-[place].org/oursaviourshousing/          Housing       Coordinated Entry access point  3  [facility] Distance: 3.64 miles      In-Person, Phone/Virtual   [address] [place] [address]  Language: English  Hours: Mon - Fri 8:30 AM - 4:30 PM  Fees: Free   Phone: (812) 736-1116 Email: info@[facility].org Website: https://www.[facility].org/locations/[facility]/     4  [facility] [place] [place] ([facility] - Housing Services Distance: 9.6 miles      In-Person   [address] [place] [address]  Language: English  Hours: Mon - Fri 9:00 AM - 5:00 PM  Fees: Free   Phone: (596) 987-7796 Email: housing@[facility].org Website: http://www.[facility].org/housing     Drop-in center or day shelter  5  [facility] Distance: 4.51 miles      In-Person   [address] [place] [address]  Language: English  Hours: Mon - Fri 9:00 AM - 4:00 PM  Fees: Free   Phone: (847) 965-2052 Email: tiffanyk@[employer].org Website: http://listeninghouse.org     6  [facility] Distance: 9.74 miles      In-Person    [address] [place] [address]  Language: English, Citizen of [place], Pashto  Hours: Mon - Sat 7:00 AM - 3:00 PM  Fees: Free, Self Pay   Phone: (168) 258-5434 Email: info@[employer] Website: https://www.[employer]/locations/opportunity-center/     Housing search assistance  7  [employer] - https://[employer]/ Distance: 10.24 miles      Phone/Virtual   [address] [place] [address]  Language: English  Hours: Mon - Sun Open 24 Hours   Email: info@[employer] Website: https://[employer]     8  [employer] - Online housing search assistance Distance: 11.3 miles      Phone/Virtual   [address] [address]  Language: English, Hmong, Citizen of [place], Pashto  Hours: Mon - Sun Open 24 Hours   Phone: (321) 325-8041 Email: info@[employer] Website: http://www.GinzaMetricslink.org/     Shelter for families  9  [facility] Distance: 4.86 miles      In-Person   [address] [place] [address]  Language: English  Hours: Mon - Sun Open 24 Hours  Fees: Free, Sliding Fee   Phone: (746) 651-5933 Ext.1 Email: info@[employer] Website: http://www.[facility] CenterTransluminal Technologies     10  [facility] Distance: 18.78 miles      In-Person   [address] [place] [address]  Language: English  Hours: Mon - Fri 3:00 PM - 9:00 AM , Sat - Sun Open 24 Hours  Fees: Free   Phone: (531) 148-4806 Ext.1 Website: https://www.[facility]s.org/2020/07/03/emergency-family-shelter/     Shelter for individuals  11  [facility] [place] [facility] [place] [facility] - [facility] [place] [facility] Distance: 3.66 miles      In-Person   [address] [place] [address]  Language: English  Hours: Mon - Sun 5:00 PM - 10:00 AM  Fees: Free, Self Pay   Phone: (983) 479-4580 Email: info@[facility]ct[facility]ities.org Website: https://www.[facility].org/locations/[facility]ul/     12   [facility] Distance: 9.5 miles      In-Person   [address] [place] [address]  Language: English  Hours: Mon - Sun Open 24 Hours  Fees: Free   Phone: (153) 247-2380 Email: communications@[facility].org Website: https://[facility]-[place].org/oursaviourshousing/          Important Numbers & Websites       [address].org  Poison Control   (127) 125-4997 [place]  Suicide and Crisis Lifeline   [address] [place], [place] [address]line.org  Childhelp [place] Child Abuse Hotline   702.855.4486 Childhelphotline.org  [place] Sexual Assault Hotline   (609) 106-8774 (HOPE) [facility].org  [place] Runaway Safeline   (703) 775-8365 (RUNAWAY) [facility] Hospital[facility]away.org  Pregnancy & Postpartum Support [place]   Call/text 694-329-9729 Ppsupportmn.org  Substance Abuse National Helpline ([facility]   299-704-HELP (5306) Findtreatment.gov  Emergency Services   911

## 2023-12-20 ENCOUNTER — HOSPITAL ENCOUNTER (OUTPATIENT)
Dept: CT IMAGING | Facility: CLINIC | Age: 33
Discharge: HOME | End: 2023-12-20
Attending: NURSE PRACTITIONER | Admitting: NURSE PRACTITIONER
Payer: COMMERCIAL

## 2023-12-20 DIAGNOSIS — Z82.49 FAMILY HISTORY OF CORONARY ARTERY DISEASE: ICD-10-CM

## 2023-12-20 PROCEDURE — 75571 CT HRT W/O DYE W/CA TEST: CPT | Mod: 26 | Performed by: STUDENT IN AN ORGANIZED HEALTH CARE EDUCATION/TRAINING PROGRAM

## 2023-12-20 PROCEDURE — 75571 CT HRT W/O DYE W/CA TEST: CPT

## 2023-12-21 LAB
APO B100 SERPL-MCNC: 81 MG/DL
TESTOST FREE SERPL-MCNC: 10.59 NG/DL
TESTOST SERPL-MCNC: 558 NG/DL (ref 240–950)

## 2024-07-20 ENCOUNTER — HEALTH MAINTENANCE LETTER (OUTPATIENT)
Age: 34
End: 2024-07-20

## 2024-08-02 SDOH — HEALTH STABILITY: PHYSICAL HEALTH: ON AVERAGE, HOW MANY DAYS PER WEEK DO YOU ENGAGE IN MODERATE TO STRENUOUS EXERCISE (LIKE A BRISK WALK)?: 7 DAYS

## 2024-08-02 SDOH — HEALTH STABILITY: PHYSICAL HEALTH: ON AVERAGE, HOW MANY MINUTES DO YOU ENGAGE IN EXERCISE AT THIS LEVEL?: 60 MIN

## 2024-08-02 ASSESSMENT — SOCIAL DETERMINANTS OF HEALTH (SDOH): HOW OFTEN DO YOU GET TOGETHER WITH FRIENDS OR RELATIVES?: MORE THAN THREE TIMES A WEEK

## 2024-08-07 ENCOUNTER — OFFICE VISIT (OUTPATIENT)
Dept: INTERNAL MEDICINE | Facility: CLINIC | Age: 34
End: 2024-08-07
Payer: COMMERCIAL

## 2024-08-07 VITALS
HEIGHT: 70 IN | OXYGEN SATURATION: 99 % | HEART RATE: 62 BPM | DIASTOLIC BLOOD PRESSURE: 80 MMHG | WEIGHT: 182 LBS | SYSTOLIC BLOOD PRESSURE: 112 MMHG | BODY MASS INDEX: 26.05 KG/M2 | TEMPERATURE: 97.8 F | RESPIRATION RATE: 14 BRPM

## 2024-08-07 DIAGNOSIS — Z00.00 ANNUAL PHYSICAL EXAM: ICD-10-CM

## 2024-08-07 DIAGNOSIS — Z11.4 SCREENING FOR HIV (HUMAN IMMUNODEFICIENCY VIRUS): Primary | ICD-10-CM

## 2024-08-07 DIAGNOSIS — Z13.0 SCREENING FOR ENDOCRINE, METABOLIC AND IMMUNITY DISORDER: ICD-10-CM

## 2024-08-07 DIAGNOSIS — Z11.59 NEED FOR HEPATITIS C SCREENING TEST: ICD-10-CM

## 2024-08-07 DIAGNOSIS — R19.4 CHANGE IN BOWEL HABITS: ICD-10-CM

## 2024-08-07 DIAGNOSIS — Z13.29 SCREENING FOR ENDOCRINE, METABOLIC AND IMMUNITY DISORDER: ICD-10-CM

## 2024-08-07 DIAGNOSIS — Z13.228 SCREENING FOR ENDOCRINE, METABOLIC AND IMMUNITY DISORDER: ICD-10-CM

## 2024-08-07 LAB
ALBUMIN SERPL BCG-MCNC: 5 G/DL (ref 3.5–5.2)
ALP SERPL-CCNC: 77 U/L (ref 40–150)
ALT SERPL W P-5'-P-CCNC: 41 U/L (ref 0–70)
ANION GAP SERPL CALCULATED.3IONS-SCNC: 10 MMOL/L (ref 7–15)
APO A-I SERPL-MCNC: 9 MG/DL
AST SERPL W P-5'-P-CCNC: 32 U/L (ref 0–45)
BILIRUB SERPL-MCNC: 0.7 MG/DL
BUN SERPL-MCNC: 20.7 MG/DL (ref 6–20)
CALCIUM SERPL-MCNC: 9.9 MG/DL (ref 8.8–10.4)
CHLORIDE SERPL-SCNC: 104 MMOL/L (ref 98–107)
CHOLEST SERPL-MCNC: 184 MG/DL
CREAT SERPL-MCNC: 1.02 MG/DL (ref 0.67–1.17)
CRP SERPL HS-MCNC: <0.15 MG/L
EGFRCR SERPLBLD CKD-EPI 2021: >90 ML/MIN/1.73M2
FASTING STATUS PATIENT QL REPORTED: YES
FASTING STATUS PATIENT QL REPORTED: YES
GLUCOSE SERPL-MCNC: 96 MG/DL (ref 70–99)
HBA1C MFR BLD: 5.3 % (ref 0–5.6)
HCO3 SERPL-SCNC: 27 MMOL/L (ref 22–29)
HDLC SERPL-MCNC: 74 MG/DL
INSULIN SERPL-ACNC: 3.2 UU/ML (ref 2.6–24.9)
LDLC SERPL CALC-MCNC: 102 MG/DL
MAGNESIUM SERPL-MCNC: 2.1 MG/DL (ref 1.7–2.3)
NONHDLC SERPL-MCNC: 110 MG/DL
POTASSIUM SERPL-SCNC: 4.9 MMOL/L (ref 3.4–5.3)
PROT SERPL-MCNC: 7.5 G/DL (ref 6.4–8.3)
SHBG SERPL-SCNC: 42 NMOL/L (ref 11–80)
SODIUM SERPL-SCNC: 141 MMOL/L (ref 135–145)
TRIGL SERPL-MCNC: 42 MG/DL
URATE SERPL-MCNC: 5.7 MG/DL (ref 3.4–7)
VIT D+METAB SERPL-MCNC: 50 NG/ML (ref 20–50)

## 2024-08-07 PROCEDURE — 84270 ASSAY OF SEX HORMONE GLOBUL: CPT | Performed by: NURSE PRACTITIONER

## 2024-08-07 PROCEDURE — 80061 LIPID PANEL: CPT | Performed by: NURSE PRACTITIONER

## 2024-08-07 PROCEDURE — 80053 COMPREHEN METABOLIC PANEL: CPT | Performed by: NURSE PRACTITIONER

## 2024-08-07 PROCEDURE — 83036 HEMOGLOBIN GLYCOSYLATED A1C: CPT | Performed by: NURSE PRACTITIONER

## 2024-08-07 PROCEDURE — 99000 SPECIMEN HANDLING OFFICE-LAB: CPT | Performed by: NURSE PRACTITIONER

## 2024-08-07 PROCEDURE — 83525 ASSAY OF INSULIN: CPT | Performed by: NURSE PRACTITIONER

## 2024-08-07 PROCEDURE — 36415 COLL VENOUS BLD VENIPUNCTURE: CPT | Performed by: NURSE PRACTITIONER

## 2024-08-07 PROCEDURE — 83695 ASSAY OF LIPOPROTEIN(A): CPT | Performed by: NURSE PRACTITIONER

## 2024-08-07 PROCEDURE — 82172 ASSAY OF APOLIPOPROTEIN: CPT | Mod: 90 | Performed by: NURSE PRACTITIONER

## 2024-08-07 PROCEDURE — 84403 ASSAY OF TOTAL TESTOSTERONE: CPT | Performed by: NURSE PRACTITIONER

## 2024-08-07 PROCEDURE — 99395 PREV VISIT EST AGE 18-39: CPT | Performed by: NURSE PRACTITIONER

## 2024-08-07 PROCEDURE — 83735 ASSAY OF MAGNESIUM: CPT | Performed by: NURSE PRACTITIONER

## 2024-08-07 PROCEDURE — 84550 ASSAY OF BLOOD/URIC ACID: CPT | Performed by: NURSE PRACTITIONER

## 2024-08-07 PROCEDURE — 86141 C-REACTIVE PROTEIN HS: CPT | Performed by: NURSE PRACTITIONER

## 2024-08-07 PROCEDURE — 82306 VITAMIN D 25 HYDROXY: CPT | Performed by: NURSE PRACTITIONER

## 2024-08-07 RX ORDER — TYROSINE 500 MG
TABLET ORAL
COMMUNITY
Start: 2024-01-01

## 2024-08-07 RX ORDER — MULTIVITAMIN WITH IRON
TABLET ORAL
COMMUNITY
Start: 2024-01-01

## 2024-08-07 NOTE — PROGRESS NOTES
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  He is very proactive about his health and would like a variety of labs checked today to monitor his overall metabolic health.    Screening for HIV (human immunodeficiency virus)  - HIV Antigen Antibody Combo; Future    Need for hepatitis C screening test  - Hepatitis C Screen Reflex to HCV RNA Quant and Genotype; Future    Screening for endocrine, metabolic and immunity disorder  - Comprehensive metabolic panel; Future  - Lipid Profile (Chol, Trig, HDL, LDL calc); Future  - Apolipoprotein B; Future  - Lipoprotein (a); Future  - Hemoglobin A1c; Future  - Insulin level; Future  - Vitamin D Deficiency; Future  - CRP cardiac risk; Future  - Uric acid; Future  - Testosterone Free and Total; Future  - Magnesium; Future    Change in bowel habits  - Adult GI  Referral - Procedure Only; Future  - Adult Urology  Referral; Future    Patient Instructions   Blood pressure and other vital signs look good.    Weight looks good.    We will check a variety of labs today.  Results will come through on [employer].    Call [facility] at [phone] and inquire about the cost for diagnostic colonoscopy.    Someone should be calling you within the next few business days for a urology referral.    Follow-up with me in 1 year, sooner if needed              BMI  Estimated body mass index is 26.49 kg/m  as calculated from the following:    Height as of this encounter: 1.765 m (5' 9.5\").    Weight as of this encounter: 82.6 kg (182 lb).       Counseling  Appropriate preventive services were addressed with this patient via screening, questionnaire, or discussion as appropriate for fall prevention, nutrition, physical activity, Tobacco-use cessation, weight loss and " cognition.  Checklist reviewing preventive services available has been given to the patient.  Reviewed patient's diet, addressing concerns and/or questions.           Subjective   Sheldon is a 33 year old, presenting for the following:  Physical (Pt is fasting)        8/7/2024     8:38 AM   Additional Questions   Roomed by Gem HUANG        Health Care Directive  Patient does not have a Health Care Directive or Living Will: Patient states has Advance Directive and will bring in a copy to clinic.    HPI  Here for physical exam.             8/2/2024   General Health   How would you rate your overall physical health? Excellent   Feel stress (tense, anxious, or unable to sleep) Not at all            8/2/2024   Nutrition   Three or more servings of calcium each day? Yes   Diet: Other   If other, please elaborate: 165-170/g protein, daily   How many servings of fruit and vegetables per day? 4 or more   How many sweetened beverages each day? 0-1            8/2/2024   Exercise   Days per week of moderate/strenous exercise 7 days   Average minutes spent exercising at this level 60 min            8/2/2024   Social Factors   Frequency of gathering with friends or relatives More than three times a week   Worry food won't last until get money to buy more No   Food not last or not have enough money for food? No   Do you have housing? (Housing is defined as stable permanent housing and does not include staying ouside in a car, in a tent, in an abandoned building, in an overnight shelter, or couch-surfing.) Yes   Are you worried about losing your housing? No   Lack of transportation? No   Unable to get utilities (heat,electricity)? No            8/2/2024   Dental   Dentist two times every year? Yes            8/2/2024   TB Screening   Were you born outside of the [place]? No            Today's PHQ-2 Score:       8/7/2024     8:36 AM   PHQ-2 ( 1999 [employer])   Q1: Little interest or pleasure in doing things 0   Q2: Feeling down, depressed or  "hopeless 0   PHQ-2 Score 0   Q1: Little interest or pleasure in doing things Not at all   Q2: Feeling down, depressed or hopeless Not at all   PHQ-2 Score 0           8/2/2024   Substance Use   Alcohol more than 3/day or more than 7/wk No   Do you use any other substances recreationally? No        Social History     Tobacco Use    Smoking status: Never     Passive exposure: Never    Smokeless tobacco: Former     Types: Chew   Vaping Use    Vaping status: Never Used   Substance Use Topics    Alcohol use: Yes     Comment: Occassional    Drug use: Never             8/2/2024   One time HIV Screening   Previous HIV test? No          8/2/2024   STI Screening   New sexual partner(s) since last STI/HIV test? No            8/2/2024   Contraception/Family Planning   Questions about contraception or family planning No           Reviewed and updated as needed this visit by Provider                             Objective    Exam  /80 (BP Location: Right arm, Patient Position: Sitting)   Pulse 62   Temp 97.8  F (36.6  C)   Resp 14   Ht 1.765 m (5' 9.5\")   Wt 82.6 kg (182 lb)   SpO2 99%   BMI 26.49 kg/m     Estimated body mass index is 26.49 kg/m  as calculated from the following:    Height as of this encounter: 1.765 m (5' 9.5\").    Weight as of this encounter: 82.6 kg (182 lb).    Physical Exam  GENERAL: alert and no distress  NECK: no adenopathy, no asymmetry, masses, or scars  RESP: lungs clear to auscultation - no rales, rhonchi or wheezes  CV: regular rate and rhythm, normal S1 S2, no S3 or S4, no murmur, click or rub, no peripheral edema  ABDOMEN: soft, nontender, no hepatosplenomegaly, no masses and bowel sounds normal  MS: no gross musculoskeletal defects noted, no edema        Signed Electronically by: Dean Quintana CNP    "

## 2024-08-07 NOTE — PATIENT INSTRUCTIONS
Blood pressure and other vital signs look good.    Weight looks good.    We will check a variety of labs today.  Results will come through on [employer].    Call [facility] at [phone] and inquire about the cost for diagnostic colonoscopy.    Someone should be calling you within the next few business days for a urology referral.    Follow-up with me in 1 year, sooner if needed

## 2024-08-09 LAB
APO B100 SERPL-MCNC: 85 MG/DL
TESTOST FREE SERPL-MCNC: 4.98 NG/DL
TESTOST SERPL-MCNC: 295 NG/DL (ref 240–950)

## 2024-08-29 ENCOUNTER — TELEPHONE (OUTPATIENT)
Dept: INTERNAL MEDICINE | Facility: CLINIC | Age: 34
End: 2024-08-29

## 2024-08-29 NOTE — TELEPHONE ENCOUNTER
[facility] called to verify if Referral Placed for GI.  8/7/2024 (please see referral in chart).    Please fax to [facility]- patient trying to schedule.  [phone]  Please let patient know when completed.

## 2024-08-30 NOTE — TELEPHONE ENCOUNTER
Faxed referral to [facility] this morning & notified patient.

## 2024-11-05 ENCOUNTER — TELEPHONE (OUTPATIENT)
Dept: INTERNAL MEDICINE | Facility: CLINIC | Age: 34
End: 2024-11-05
Payer: COMMERCIAL

## 2024-11-05 DIAGNOSIS — R19.4 CHANGE IN BOWEL HABITS: ICD-10-CM

## 2024-11-05 DIAGNOSIS — Z83.719 FAMILY HISTORY OF COLONIC POLYPS: Primary | ICD-10-CM

## 2024-11-05 NOTE — TELEPHONE ENCOUNTER
LOV 8/7/24    Change in bowel habits  - Adult GI  Referral - Procedure Only; Future      Call [facility] at [phone] and inquire about the cost for diagnostic colonoscopy.

## 2024-11-05 NOTE — TELEPHONE ENCOUNTER
Called to patient to confirm diagnosis;     Per pt, his father had colon cancer and had it removed. Would need this to be added for insurance to pay for it.     Per pt, he himself do not have colon cancer.     Routing to PCP to review.     Pay P. RN

## 2024-11-05 NOTE — TELEPHONE ENCOUNTER
Does he have a history of colon cancer?  If he does, then I can associate the diagnosis to the order.    If he does not have a history of colon cancer, then I cannot fraudulently order the test.

## 2024-11-05 NOTE — TELEPHONE ENCOUNTER
Order/Referral Request    Who is requesting: PT    Orders being requested: Currently there is a GI referral for [facility] on Pt's file. Pt talked w/ins and the referral/diagnosis code needs to include that Pt has a history of colon cancer so that ins will then cover this.    Reason service is needed/diagnosis: Pt has history of colon cancer needs to be added to referral    When are orders needed by: prior to 11.7.24. Pt has an appt with [facility] on 11.7.24 @ 10:45    Has this been discussed with Provider: Yes    Does patient have a preference on a Group/Provider/Facility? [facility]    Does patient have an appointment scheduled?: Yes: 11.7.24    Where to send orders: Fax revised referral to [facility], F #[phone].  [phone]    Could we [facility] this information to you [facility] or would you prefer to receive a phone call?:   Patient would prefer a phone call   Okay to leave a detailed message?: Yes at Cell number on file:    Telephone Information:   Mobile [phone]

## 2024-11-05 NOTE — TELEPHONE ENCOUNTER
Outgoing call to patient. Relayed PCP's message. I also faxed revised order to [facility] to the number given below. No further questions/concerns at this time.

## 2024-11-06 NOTE — TELEPHONE ENCOUNTER
Incoming call from [facility] to confirm a second time for the verbal that the referral was only for the family history of colon cancer. Verbal given for referral. No other questions or concerns at this time.

## 2024-11-06 NOTE — TELEPHONE ENCOUNTER
Patient followed up with [facility], they have not received updated referral, the update allows for insurance to cover procedure tomorrow patient would like us to call and give a verbal order that the referral was updated to include associated family history of colon cancer.     Huddled with PCP, RN will call [facility] and provide verbal order regarding referral.    Outgoing call to [facility] to provide verbal order that referral is for associated family history of colon cancer. No further actions needed at this time

## 2024-11-07 ENCOUNTER — TRANSFERRED RECORDS (OUTPATIENT)
Dept: HEALTH INFORMATION MANAGEMENT | Facility: CLINIC | Age: 34
End: 2024-11-07
Payer: COMMERCIAL

## 2025-06-19 ENCOUNTER — TELEPHONE (OUTPATIENT)
Dept: INTERNAL MEDICINE | Facility: CLINIC | Age: 35
End: 2025-06-19

## 2025-07-21 ENCOUNTER — PATIENT OUTREACH (OUTPATIENT)
Dept: CARE COORDINATION | Facility: CLINIC | Age: 35
End: 2025-07-21
Payer: COMMERCIAL